# Patient Record
Sex: FEMALE | URBAN - METROPOLITAN AREA
[De-identification: names, ages, dates, MRNs, and addresses within clinical notes are randomized per-mention and may not be internally consistent; named-entity substitution may affect disease eponyms.]

---

## 2020-01-01 ENCOUNTER — INPATIENT (INPATIENT)
Facility: HOSPITAL | Age: 75
LOS: 7 days | Discharge: HOME HEALTH | DRG: 177 | End: 2020-05-03
Attending: FAMILY MEDICINE | Admitting: FAMILY MEDICINE
Payer: MEDICARE

## 2020-01-01 ENCOUNTER — INPATIENT (INPATIENT)
Facility: HOSPITAL | Age: 75
LOS: 0 days | DRG: 283 | End: 2020-05-13
Attending: INTERNAL MEDICINE | Admitting: INTERNAL MEDICINE
Payer: MEDICARE

## 2020-01-01 VITALS
SYSTOLIC BLOOD PRESSURE: 80 MMHG | OXYGEN SATURATION: 100 % | TEMPERATURE: 97 F | RESPIRATION RATE: 20 BRPM | DIASTOLIC BLOOD PRESSURE: 55 MMHG | HEART RATE: 117 BPM

## 2020-01-01 VITALS — OXYGEN SATURATION: 83 % | WEIGHT: 160.06 LBS

## 2020-01-01 VITALS — HEART RATE: 80 BPM | OXYGEN SATURATION: 100 %

## 2020-01-01 VITALS — OXYGEN SATURATION: 90 %

## 2020-01-01 DIAGNOSIS — N17.0 ACUTE KIDNEY FAILURE WITH TUBULAR NECROSIS: ICD-10-CM

## 2020-01-01 DIAGNOSIS — I46.9 CARDIAC ARREST, CAUSE UNSPECIFIED: ICD-10-CM

## 2020-01-01 DIAGNOSIS — I49.01 VENTRICULAR FIBRILLATION: ICD-10-CM

## 2020-01-01 DIAGNOSIS — I21.09 ST ELEVATION (STEMI) MYOCARDIAL INFARCTION INVOLVING OTHER CORONARY ARTERY OF ANTERIOR WALL: ICD-10-CM

## 2020-01-01 DIAGNOSIS — J96.01 ACUTE RESPIRATORY FAILURE WITH HYPOXIA: ICD-10-CM

## 2020-01-01 DIAGNOSIS — G93.1 ANOXIC BRAIN DAMAGE, NOT ELSEWHERE CLASSIFIED: ICD-10-CM

## 2020-01-01 DIAGNOSIS — U07.1 COVID-19: ICD-10-CM

## 2020-01-01 DIAGNOSIS — R09.02 HYPOXEMIA: ICD-10-CM

## 2020-01-01 DIAGNOSIS — I21.3 ST ELEVATION (STEMI) MYOCARDIAL INFARCTION OF UNSPECIFIED SITE: ICD-10-CM

## 2020-01-01 DIAGNOSIS — Z66 DO NOT RESUSCITATE: ICD-10-CM

## 2020-01-01 DIAGNOSIS — J12.89 OTHER VIRAL PNEUMONIA: ICD-10-CM

## 2020-01-01 DIAGNOSIS — I46.2 CARDIAC ARREST DUE TO UNDERLYING CARDIAC CONDITION: ICD-10-CM

## 2020-01-01 LAB
A1C WITH ESTIMATED AVERAGE GLUCOSE RESULT: 5.9 % — HIGH (ref 4–5.6)
ALBUMIN SERPL ELPH-MCNC: 1.7 G/DL — LOW (ref 3.3–5)
ALBUMIN SERPL ELPH-MCNC: 2.4 G/DL — LOW (ref 3.3–5)
ALBUMIN SERPL ELPH-MCNC: 2.6 G/DL — LOW (ref 3.3–5)
ALP SERPL-CCNC: 104 U/L — SIGNIFICANT CHANGE UP (ref 40–120)
ALP SERPL-CCNC: 84 U/L — SIGNIFICANT CHANGE UP (ref 40–120)
ALP SERPL-CCNC: 85 U/L — SIGNIFICANT CHANGE UP (ref 40–120)
ALT FLD-CCNC: 102 U/L — HIGH (ref 12–78)
ALT FLD-CCNC: 57 U/L — SIGNIFICANT CHANGE UP (ref 12–78)
ALT FLD-CCNC: 58 U/L — SIGNIFICANT CHANGE UP (ref 12–78)
ANION GAP SERPL CALC-SCNC: 10 MMOL/L — SIGNIFICANT CHANGE UP (ref 5–17)
ANION GAP SERPL CALC-SCNC: 10 MMOL/L — SIGNIFICANT CHANGE UP (ref 5–17)
ANION GAP SERPL CALC-SCNC: 22 MMOL/L — HIGH (ref 5–17)
ANION GAP SERPL CALC-SCNC: 5 MMOL/L — SIGNIFICANT CHANGE UP (ref 5–17)
ANION GAP SERPL CALC-SCNC: 6 MMOL/L — SIGNIFICANT CHANGE UP (ref 5–17)
ANION GAP SERPL CALC-SCNC: 7 MMOL/L — SIGNIFICANT CHANGE UP (ref 5–17)
ANION GAP SERPL CALC-SCNC: 7 MMOL/L — SIGNIFICANT CHANGE UP (ref 5–17)
ANION GAP SERPL CALC-SCNC: 8 MMOL/L — SIGNIFICANT CHANGE UP (ref 5–17)
ANION GAP SERPL CALC-SCNC: 9 MMOL/L — SIGNIFICANT CHANGE UP (ref 5–17)
APPEARANCE UR: CLEAR — SIGNIFICANT CHANGE UP
APTT BLD: 31.5 SEC — SIGNIFICANT CHANGE UP (ref 27.5–36.3)
APTT BLD: 53.1 SEC — HIGH (ref 27.5–36.3)
AST SERPL-CCNC: 130 U/L — HIGH (ref 15–37)
AST SERPL-CCNC: 63 U/L — HIGH (ref 15–37)
AST SERPL-CCNC: 73 U/L — HIGH (ref 15–37)
BASOPHILS # BLD AUTO: 0 K/UL — SIGNIFICANT CHANGE UP (ref 0–0.2)
BASOPHILS # BLD AUTO: 0.01 K/UL — SIGNIFICANT CHANGE UP (ref 0–0.2)
BASOPHILS # BLD AUTO: 0.04 K/UL — SIGNIFICANT CHANGE UP (ref 0–0.2)
BASOPHILS NFR BLD AUTO: 0 % — SIGNIFICANT CHANGE UP (ref 0–2)
BASOPHILS NFR BLD AUTO: 0.2 % — SIGNIFICANT CHANGE UP (ref 0–2)
BASOPHILS NFR BLD AUTO: 0.4 % — SIGNIFICANT CHANGE UP (ref 0–2)
BILIRUB SERPL-MCNC: 0.2 MG/DL — SIGNIFICANT CHANGE UP (ref 0.2–1.2)
BILIRUB SERPL-MCNC: 0.4 MG/DL — SIGNIFICANT CHANGE UP (ref 0.2–1.2)
BILIRUB SERPL-MCNC: 0.4 MG/DL — SIGNIFICANT CHANGE UP (ref 0.2–1.2)
BILIRUB UR-MCNC: NEGATIVE — SIGNIFICANT CHANGE UP
BUN SERPL-MCNC: 13 MG/DL — SIGNIFICANT CHANGE UP (ref 7–23)
BUN SERPL-MCNC: 17 MG/DL — SIGNIFICANT CHANGE UP (ref 7–23)
BUN SERPL-MCNC: 22 MG/DL — SIGNIFICANT CHANGE UP (ref 7–23)
BUN SERPL-MCNC: 23 MG/DL — SIGNIFICANT CHANGE UP (ref 7–23)
BUN SERPL-MCNC: 25 MG/DL — HIGH (ref 7–23)
BUN SERPL-MCNC: 26 MG/DL — HIGH (ref 7–23)
BUN SERPL-MCNC: 29 MG/DL — HIGH (ref 7–23)
CALCIUM SERPL-MCNC: 8.1 MG/DL — LOW (ref 8.5–10.1)
CALCIUM SERPL-MCNC: 8.4 MG/DL — LOW (ref 8.5–10.1)
CALCIUM SERPL-MCNC: 8.4 MG/DL — LOW (ref 8.5–10.1)
CALCIUM SERPL-MCNC: 8.6 MG/DL — SIGNIFICANT CHANGE UP (ref 8.5–10.1)
CALCIUM SERPL-MCNC: 8.8 MG/DL — SIGNIFICANT CHANGE UP (ref 8.5–10.1)
CALCIUM SERPL-MCNC: 8.9 MG/DL — SIGNIFICANT CHANGE UP (ref 8.5–10.1)
CALCIUM SERPL-MCNC: 9.1 MG/DL — SIGNIFICANT CHANGE UP (ref 8.5–10.1)
CHLORIDE SERPL-SCNC: 100 MMOL/L — SIGNIFICANT CHANGE UP (ref 96–108)
CHLORIDE SERPL-SCNC: 103 MMOL/L — SIGNIFICANT CHANGE UP (ref 96–108)
CHLORIDE SERPL-SCNC: 103 MMOL/L — SIGNIFICANT CHANGE UP (ref 96–108)
CHLORIDE SERPL-SCNC: 105 MMOL/L — SIGNIFICANT CHANGE UP (ref 96–108)
CHLORIDE SERPL-SCNC: 106 MMOL/L — SIGNIFICANT CHANGE UP (ref 96–108)
CHLORIDE SERPL-SCNC: 108 MMOL/L — SIGNIFICANT CHANGE UP (ref 96–108)
CHLORIDE SERPL-SCNC: 98 MMOL/L — SIGNIFICANT CHANGE UP (ref 96–108)
CHLORIDE SERPL-SCNC: 98 MMOL/L — SIGNIFICANT CHANGE UP (ref 96–108)
CHLORIDE SERPL-SCNC: 99 MMOL/L — SIGNIFICANT CHANGE UP (ref 96–108)
CK SERPL-CCNC: 77 U/L — SIGNIFICANT CHANGE UP (ref 26–192)
CO2 SERPL-SCNC: 15 MMOL/L — LOW (ref 22–31)
CO2 SERPL-SCNC: 23 MMOL/L — SIGNIFICANT CHANGE UP (ref 22–31)
CO2 SERPL-SCNC: 25 MMOL/L — SIGNIFICANT CHANGE UP (ref 22–31)
CO2 SERPL-SCNC: 25 MMOL/L — SIGNIFICANT CHANGE UP (ref 22–31)
CO2 SERPL-SCNC: 26 MMOL/L — SIGNIFICANT CHANGE UP (ref 22–31)
CO2 SERPL-SCNC: 30 MMOL/L — SIGNIFICANT CHANGE UP (ref 22–31)
CO2 SERPL-SCNC: 30 MMOL/L — SIGNIFICANT CHANGE UP (ref 22–31)
CO2 SERPL-SCNC: 31 MMOL/L — SIGNIFICANT CHANGE UP (ref 22–31)
CO2 SERPL-SCNC: 33 MMOL/L — HIGH (ref 22–31)
COLOR SPEC: YELLOW — SIGNIFICANT CHANGE UP
CREAT SERPL-MCNC: 0.54 MG/DL — SIGNIFICANT CHANGE UP (ref 0.5–1.3)
CREAT SERPL-MCNC: 0.55 MG/DL — SIGNIFICANT CHANGE UP (ref 0.5–1.3)
CREAT SERPL-MCNC: 0.55 MG/DL — SIGNIFICANT CHANGE UP (ref 0.5–1.3)
CREAT SERPL-MCNC: 0.6 MG/DL — SIGNIFICANT CHANGE UP (ref 0.5–1.3)
CREAT SERPL-MCNC: 0.66 MG/DL — SIGNIFICANT CHANGE UP (ref 0.5–1.3)
CREAT SERPL-MCNC: 0.7 MG/DL — SIGNIFICANT CHANGE UP (ref 0.5–1.3)
CREAT SERPL-MCNC: 0.78 MG/DL — SIGNIFICANT CHANGE UP (ref 0.5–1.3)
CREAT SERPL-MCNC: 0.78 MG/DL — SIGNIFICANT CHANGE UP (ref 0.5–1.3)
CREAT SERPL-MCNC: 1.08 MG/DL — SIGNIFICANT CHANGE UP (ref 0.5–1.3)
CRP SERPL-MCNC: 0.39 MG/DL — SIGNIFICANT CHANGE UP (ref 0–0.4)
CRP SERPL-MCNC: 0.62 MG/DL — HIGH (ref 0–0.4)
CRP SERPL-MCNC: 1.84 MG/DL — HIGH (ref 0–0.4)
CRP SERPL-MCNC: 7.37 MG/DL — HIGH (ref 0–0.4)
CULTURE RESULTS: SIGNIFICANT CHANGE UP
D DIMER BLD IA.RAPID-MCNC: 385 NG/ML DDU — HIGH
D DIMER BLD IA.RAPID-MCNC: 512 NG/ML DDU — HIGH
D DIMER BLD IA.RAPID-MCNC: 600 NG/ML DDU — HIGH
D DIMER BLD IA.RAPID-MCNC: HIGH NG/ML DDU
DIFF PNL FLD: NEGATIVE — SIGNIFICANT CHANGE UP
EOSINOPHIL # BLD AUTO: 0 K/UL — SIGNIFICANT CHANGE UP (ref 0–0.5)
EOSINOPHIL # BLD AUTO: 0.09 K/UL — SIGNIFICANT CHANGE UP (ref 0–0.5)
EOSINOPHIL # BLD AUTO: 0.13 K/UL — SIGNIFICANT CHANGE UP (ref 0–0.5)
EOSINOPHIL NFR BLD AUTO: 0 % — SIGNIFICANT CHANGE UP (ref 0–6)
EOSINOPHIL NFR BLD AUTO: 1 % — SIGNIFICANT CHANGE UP (ref 0–6)
EOSINOPHIL NFR BLD AUTO: 1.2 % — SIGNIFICANT CHANGE UP (ref 0–6)
ESTIMATED AVERAGE GLUCOSE: 123 MG/DL — HIGH (ref 68–114)
FERRITIN SERPL-MCNC: 1086 NG/ML — HIGH (ref 15–150)
FERRITIN SERPL-MCNC: 1247 NG/ML — HIGH (ref 15–150)
FERRITIN SERPL-MCNC: 2525 NG/ML — HIGH (ref 15–150)
FIBRINOGEN PPP-MCNC: 546 MG/DL — HIGH (ref 320–520)
GLUCOSE SERPL-MCNC: 100 MG/DL — HIGH (ref 70–99)
GLUCOSE SERPL-MCNC: 105 MG/DL — HIGH (ref 70–99)
GLUCOSE SERPL-MCNC: 111 MG/DL — HIGH (ref 70–99)
GLUCOSE SERPL-MCNC: 122 MG/DL — HIGH (ref 70–99)
GLUCOSE SERPL-MCNC: 130 MG/DL — HIGH (ref 70–99)
GLUCOSE SERPL-MCNC: 144 MG/DL — HIGH (ref 70–99)
GLUCOSE SERPL-MCNC: 147 MG/DL — HIGH (ref 70–99)
GLUCOSE SERPL-MCNC: 203 MG/DL — HIGH (ref 70–99)
GLUCOSE SERPL-MCNC: 441 MG/DL — HIGH (ref 70–99)
GLUCOSE UR QL: NEGATIVE MG/DL — SIGNIFICANT CHANGE UP
HCT VFR BLD CALC: 34.5 % — SIGNIFICANT CHANGE UP (ref 34.5–45)
HCT VFR BLD CALC: 39.5 % — SIGNIFICANT CHANGE UP (ref 34.5–45)
HCT VFR BLD CALC: 40 % — SIGNIFICANT CHANGE UP (ref 34.5–45)
HCT VFR BLD CALC: 41.1 % — SIGNIFICANT CHANGE UP (ref 34.5–45)
HCT VFR BLD CALC: 41.4 % — SIGNIFICANT CHANGE UP (ref 34.5–45)
HCT VFR BLD CALC: 41.5 % — SIGNIFICANT CHANGE UP (ref 34.5–45)
HCT VFR BLD CALC: 41.8 % — SIGNIFICANT CHANGE UP (ref 34.5–45)
HCV AB S/CO SERPL IA: 0.24 S/CO — SIGNIFICANT CHANGE UP (ref 0–0.99)
HCV AB SERPL-IMP: SIGNIFICANT CHANGE UP
HGB BLD-MCNC: 10.1 G/DL — LOW (ref 11.5–15.5)
HGB BLD-MCNC: 13.4 G/DL — SIGNIFICANT CHANGE UP (ref 11.5–15.5)
HGB BLD-MCNC: 13.5 G/DL — SIGNIFICANT CHANGE UP (ref 11.5–15.5)
HGB BLD-MCNC: 13.6 G/DL — SIGNIFICANT CHANGE UP (ref 11.5–15.5)
HGB BLD-MCNC: 13.8 G/DL — SIGNIFICANT CHANGE UP (ref 11.5–15.5)
HGB BLD-MCNC: 14.1 G/DL — SIGNIFICANT CHANGE UP (ref 11.5–15.5)
HGB BLD-MCNC: 14.2 G/DL — SIGNIFICANT CHANGE UP (ref 11.5–15.5)
IMM GRANULOCYTES NFR BLD AUTO: 1.6 % — HIGH (ref 0–1.5)
IMM GRANULOCYTES NFR BLD AUTO: 5.2 % — HIGH (ref 0–1.5)
INR BLD: 1.1 RATIO — SIGNIFICANT CHANGE UP (ref 0.88–1.16)
INR BLD: 1.11 RATIO — SIGNIFICANT CHANGE UP (ref 0.88–1.16)
KETONES UR-MCNC: NEGATIVE — SIGNIFICANT CHANGE UP
LACTATE SERPL-SCNC: 1.4 MMOL/L — SIGNIFICANT CHANGE UP (ref 0.7–2)
LACTATE SERPL-SCNC: 15.1 MMOL/L — CRITICAL HIGH (ref 0.7–2)
LDH SERPL L TO P-CCNC: 355 U/L — HIGH (ref 84–241)
LDH SERPL L TO P-CCNC: 383 U/L — HIGH (ref 84–241)
LEUKOCYTE ESTERASE UR-ACNC: ABNORMAL
LYMPHOCYTES # BLD AUTO: 0.48 K/UL — LOW (ref 1–3.3)
LYMPHOCYTES # BLD AUTO: 0.97 K/UL — LOW (ref 1–3.3)
LYMPHOCYTES # BLD AUTO: 10.7 % — LOW (ref 13–44)
LYMPHOCYTES # BLD AUTO: 3.71 K/UL — HIGH (ref 1–3.3)
LYMPHOCYTES # BLD AUTO: 42 % — SIGNIFICANT CHANGE UP (ref 13–44)
LYMPHOCYTES # BLD AUTO: 9.1 % — LOW (ref 13–44)
MCHC RBC-ENTMCNC: 28.5 PG — SIGNIFICANT CHANGE UP (ref 27–34)
MCHC RBC-ENTMCNC: 28.5 PG — SIGNIFICANT CHANGE UP (ref 27–34)
MCHC RBC-ENTMCNC: 28.6 PG — SIGNIFICANT CHANGE UP (ref 27–34)
MCHC RBC-ENTMCNC: 28.8 PG — SIGNIFICANT CHANGE UP (ref 27–34)
MCHC RBC-ENTMCNC: 28.9 PG — SIGNIFICANT CHANGE UP (ref 27–34)
MCHC RBC-ENTMCNC: 28.9 PG — SIGNIFICANT CHANGE UP (ref 27–34)
MCHC RBC-ENTMCNC: 29.2 PG — SIGNIFICANT CHANGE UP (ref 27–34)
MCHC RBC-ENTMCNC: 29.3 GM/DL — LOW (ref 32–36)
MCHC RBC-ENTMCNC: 33.1 GM/DL — SIGNIFICANT CHANGE UP (ref 32–36)
MCHC RBC-ENTMCNC: 33.3 GM/DL — SIGNIFICANT CHANGE UP (ref 32–36)
MCHC RBC-ENTMCNC: 33.7 GM/DL — SIGNIFICANT CHANGE UP (ref 32–36)
MCHC RBC-ENTMCNC: 33.8 GM/DL — SIGNIFICANT CHANGE UP (ref 32–36)
MCHC RBC-ENTMCNC: 33.9 GM/DL — SIGNIFICANT CHANGE UP (ref 32–36)
MCHC RBC-ENTMCNC: 34.2 GM/DL — SIGNIFICANT CHANGE UP (ref 32–36)
MCV RBC AUTO: 84.7 FL — SIGNIFICANT CHANGE UP (ref 80–100)
MCV RBC AUTO: 85.2 FL — SIGNIFICANT CHANGE UP (ref 80–100)
MCV RBC AUTO: 85.3 FL — SIGNIFICANT CHANGE UP (ref 80–100)
MCV RBC AUTO: 85.4 FL — SIGNIFICANT CHANGE UP (ref 80–100)
MCV RBC AUTO: 85.7 FL — SIGNIFICANT CHANGE UP (ref 80–100)
MCV RBC AUTO: 86 FL — SIGNIFICANT CHANGE UP (ref 80–100)
MCV RBC AUTO: 98.3 FL — SIGNIFICANT CHANGE UP (ref 80–100)
MONOCYTES # BLD AUTO: 0.33 K/UL — SIGNIFICANT CHANGE UP (ref 0–0.9)
MONOCYTES # BLD AUTO: 0.53 K/UL — SIGNIFICANT CHANGE UP (ref 0–0.9)
MONOCYTES # BLD AUTO: 0.72 K/UL — SIGNIFICANT CHANGE UP (ref 0–0.9)
MONOCYTES NFR BLD AUTO: 6 % — SIGNIFICANT CHANGE UP (ref 2–14)
MONOCYTES NFR BLD AUTO: 6.8 % — SIGNIFICANT CHANGE UP (ref 2–14)
MONOCYTES NFR BLD AUTO: 7.4 % — SIGNIFICANT CHANGE UP (ref 2–14)
NEUTROPHILS # BLD AUTO: 3.59 K/UL — SIGNIFICANT CHANGE UP (ref 1.8–7.4)
NEUTROPHILS # BLD AUTO: 4.15 K/UL — SIGNIFICANT CHANGE UP (ref 1.8–7.4)
NEUTROPHILS # BLD AUTO: 8.23 K/UL — HIGH (ref 1.8–7.4)
NEUTROPHILS NFR BLD AUTO: 43 % — SIGNIFICANT CHANGE UP (ref 43–77)
NEUTROPHILS NFR BLD AUTO: 77.3 % — HIGH (ref 43–77)
NEUTROPHILS NFR BLD AUTO: 80.1 % — HIGH (ref 43–77)
NITRITE UR-MCNC: NEGATIVE — SIGNIFICANT CHANGE UP
NRBC # BLD: SIGNIFICANT CHANGE UP /100 WBCS (ref 0–0)
PH UR: 6 — SIGNIFICANT CHANGE UP (ref 5–8)
PLATELET # BLD AUTO: 179 K/UL — SIGNIFICANT CHANGE UP (ref 150–400)
PLATELET # BLD AUTO: 210 K/UL — SIGNIFICANT CHANGE UP (ref 150–400)
PLATELET # BLD AUTO: 217 K/UL — SIGNIFICANT CHANGE UP (ref 150–400)
PLATELET # BLD AUTO: 228 K/UL — SIGNIFICANT CHANGE UP (ref 150–400)
PLATELET # BLD AUTO: 303 K/UL — SIGNIFICANT CHANGE UP (ref 150–400)
PLATELET # BLD AUTO: 369 K/UL — SIGNIFICANT CHANGE UP (ref 150–400)
PLATELET # BLD AUTO: 392 K/UL — SIGNIFICANT CHANGE UP (ref 150–400)
POTASSIUM SERPL-MCNC: 3.7 MMOL/L — SIGNIFICANT CHANGE UP (ref 3.5–5.3)
POTASSIUM SERPL-MCNC: 3.9 MMOL/L — SIGNIFICANT CHANGE UP (ref 3.5–5.3)
POTASSIUM SERPL-MCNC: 4 MMOL/L — SIGNIFICANT CHANGE UP (ref 3.5–5.3)
POTASSIUM SERPL-MCNC: 4 MMOL/L — SIGNIFICANT CHANGE UP (ref 3.5–5.3)
POTASSIUM SERPL-MCNC: 4.1 MMOL/L — SIGNIFICANT CHANGE UP (ref 3.5–5.3)
POTASSIUM SERPL-MCNC: 4.1 MMOL/L — SIGNIFICANT CHANGE UP (ref 3.5–5.3)
POTASSIUM SERPL-MCNC: 4.4 MMOL/L — SIGNIFICANT CHANGE UP (ref 3.5–5.3)
POTASSIUM SERPL-MCNC: 4.4 MMOL/L — SIGNIFICANT CHANGE UP (ref 3.5–5.3)
POTASSIUM SERPL-MCNC: 4.6 MMOL/L — SIGNIFICANT CHANGE UP (ref 3.5–5.3)
POTASSIUM SERPL-SCNC: 3.7 MMOL/L — SIGNIFICANT CHANGE UP (ref 3.5–5.3)
POTASSIUM SERPL-SCNC: 3.9 MMOL/L — SIGNIFICANT CHANGE UP (ref 3.5–5.3)
POTASSIUM SERPL-SCNC: 4 MMOL/L — SIGNIFICANT CHANGE UP (ref 3.5–5.3)
POTASSIUM SERPL-SCNC: 4 MMOL/L — SIGNIFICANT CHANGE UP (ref 3.5–5.3)
POTASSIUM SERPL-SCNC: 4.1 MMOL/L — SIGNIFICANT CHANGE UP (ref 3.5–5.3)
POTASSIUM SERPL-SCNC: 4.1 MMOL/L — SIGNIFICANT CHANGE UP (ref 3.5–5.3)
POTASSIUM SERPL-SCNC: 4.4 MMOL/L — SIGNIFICANT CHANGE UP (ref 3.5–5.3)
POTASSIUM SERPL-SCNC: 4.4 MMOL/L — SIGNIFICANT CHANGE UP (ref 3.5–5.3)
POTASSIUM SERPL-SCNC: 4.6 MMOL/L — SIGNIFICANT CHANGE UP (ref 3.5–5.3)
PROCALCITONIN SERPL-MCNC: 0.05 NG/ML — SIGNIFICANT CHANGE UP (ref 0.02–0.1)
PROCALCITONIN SERPL-MCNC: 0.12 NG/ML — HIGH (ref 0.02–0.1)
PROT SERPL-MCNC: 4.9 GM/DL — LOW (ref 6–8.3)
PROT SERPL-MCNC: 6.5 GM/DL — SIGNIFICANT CHANGE UP (ref 6–8.3)
PROT SERPL-MCNC: 7 GM/DL — SIGNIFICANT CHANGE UP (ref 6–8.3)
PROT UR-MCNC: NEGATIVE MG/DL — SIGNIFICANT CHANGE UP
PROTHROM AB SERPL-ACNC: 12.2 SEC — SIGNIFICANT CHANGE UP (ref 10–12.9)
PROTHROM AB SERPL-ACNC: 12.4 SEC — SIGNIFICANT CHANGE UP (ref 10–12.9)
RBC # BLD: 3.51 M/UL — LOW (ref 3.8–5.2)
RBC # BLD: 4.63 M/UL — SIGNIFICANT CHANGE UP (ref 3.8–5.2)
RBC # BLD: 4.72 M/UL — SIGNIFICANT CHANGE UP (ref 3.8–5.2)
RBC # BLD: 4.78 M/UL — SIGNIFICANT CHANGE UP (ref 3.8–5.2)
RBC # BLD: 4.85 M/UL — SIGNIFICANT CHANGE UP (ref 3.8–5.2)
RBC # BLD: 4.87 M/UL — SIGNIFICANT CHANGE UP (ref 3.8–5.2)
RBC # BLD: 4.88 M/UL — SIGNIFICANT CHANGE UP (ref 3.8–5.2)
RBC # FLD: 12.3 % — SIGNIFICANT CHANGE UP (ref 10.3–14.5)
RBC # FLD: 12.4 % — SIGNIFICANT CHANGE UP (ref 10.3–14.5)
RBC # FLD: 12.8 % — SIGNIFICANT CHANGE UP (ref 10.3–14.5)
RBC # FLD: 12.9 % — SIGNIFICANT CHANGE UP (ref 10.3–14.5)
RBC # FLD: 12.9 % — SIGNIFICANT CHANGE UP (ref 10.3–14.5)
RBC # FLD: 13 % — SIGNIFICANT CHANGE UP (ref 10.3–14.5)
RBC # FLD: 13.1 % — SIGNIFICANT CHANGE UP (ref 10.3–14.5)
SARS-COV-2 RNA SPEC QL NAA+PROBE: DETECTED
SARS-COV-2 RNA SPEC QL NAA+PROBE: SIGNIFICANT CHANGE UP
SODIUM SERPL-SCNC: 135 MMOL/L — SIGNIFICANT CHANGE UP (ref 135–145)
SODIUM SERPL-SCNC: 136 MMOL/L — SIGNIFICANT CHANGE UP (ref 135–145)
SODIUM SERPL-SCNC: 136 MMOL/L — SIGNIFICANT CHANGE UP (ref 135–145)
SODIUM SERPL-SCNC: 138 MMOL/L — SIGNIFICANT CHANGE UP (ref 135–145)
SODIUM SERPL-SCNC: 138 MMOL/L — SIGNIFICANT CHANGE UP (ref 135–145)
SODIUM SERPL-SCNC: 139 MMOL/L — SIGNIFICANT CHANGE UP (ref 135–145)
SODIUM SERPL-SCNC: 140 MMOL/L — SIGNIFICANT CHANGE UP (ref 135–145)
SP GR SPEC: 1.02 — SIGNIFICANT CHANGE UP (ref 1.01–1.02)
SPECIMEN SOURCE: SIGNIFICANT CHANGE UP
UROBILINOGEN FLD QL: NEGATIVE MG/DL — SIGNIFICANT CHANGE UP
WBC # BLD: 10.44 K/UL — SIGNIFICANT CHANGE UP (ref 3.8–10.5)
WBC # BLD: 10.64 K/UL — HIGH (ref 3.8–10.5)
WBC # BLD: 2.98 K/UL — LOW (ref 3.8–10.5)
WBC # BLD: 4.48 K/UL — SIGNIFICANT CHANGE UP (ref 3.8–10.5)
WBC # BLD: 7.77 K/UL — SIGNIFICANT CHANGE UP (ref 3.8–10.5)
WBC # BLD: 8.23 K/UL — SIGNIFICANT CHANGE UP (ref 3.8–10.5)
WBC # BLD: 8.83 K/UL — SIGNIFICANT CHANGE UP (ref 3.8–10.5)
WBC # FLD AUTO: 10.44 K/UL — SIGNIFICANT CHANGE UP (ref 3.8–10.5)
WBC # FLD AUTO: 10.64 K/UL — HIGH (ref 3.8–10.5)
WBC # FLD AUTO: 2.98 K/UL — LOW (ref 3.8–10.5)
WBC # FLD AUTO: 4.48 K/UL — SIGNIFICANT CHANGE UP (ref 3.8–10.5)
WBC # FLD AUTO: 7.77 K/UL — SIGNIFICANT CHANGE UP (ref 3.8–10.5)
WBC # FLD AUTO: 8.23 K/UL — SIGNIFICANT CHANGE UP (ref 3.8–10.5)
WBC # FLD AUTO: 8.83 K/UL — SIGNIFICANT CHANGE UP (ref 3.8–10.5)

## 2020-01-01 PROCEDURE — 36415 COLL VENOUS BLD VENIPUNCTURE: CPT

## 2020-01-01 PROCEDURE — 70450 CT HEAD/BRAIN W/O DYE: CPT | Mod: 26

## 2020-01-01 PROCEDURE — 85027 COMPLETE CBC AUTOMATED: CPT

## 2020-01-01 PROCEDURE — 80053 COMPREHEN METABOLIC PANEL: CPT

## 2020-01-01 PROCEDURE — 71275 CT ANGIOGRAPHY CHEST: CPT

## 2020-01-01 PROCEDURE — 86140 C-REACTIVE PROTEIN: CPT

## 2020-01-01 PROCEDURE — 99233 SBSQ HOSP IP/OBS HIGH 50: CPT | Mod: CS

## 2020-01-01 PROCEDURE — 85025 COMPLETE CBC W/AUTO DIFF WBC: CPT

## 2020-01-01 PROCEDURE — 99223 1ST HOSP IP/OBS HIGH 75: CPT

## 2020-01-01 PROCEDURE — 85379 FIBRIN DEGRADATION QUANT: CPT

## 2020-01-01 PROCEDURE — 83615 LACTATE (LD) (LDH) ENZYME: CPT

## 2020-01-01 PROCEDURE — 83605 ASSAY OF LACTIC ACID: CPT

## 2020-01-01 PROCEDURE — 99232 SBSQ HOSP IP/OBS MODERATE 35: CPT | Mod: CS

## 2020-01-01 PROCEDURE — 99223 1ST HOSP IP/OBS HIGH 75: CPT | Mod: CS

## 2020-01-01 PROCEDURE — 93010 ELECTROCARDIOGRAM REPORT: CPT

## 2020-01-01 PROCEDURE — 86803 HEPATITIS C AB TEST: CPT

## 2020-01-01 PROCEDURE — 82962 GLUCOSE BLOOD TEST: CPT

## 2020-01-01 PROCEDURE — 71275 CT ANGIOGRAPHY CHEST: CPT | Mod: 26

## 2020-01-01 PROCEDURE — 82728 ASSAY OF FERRITIN: CPT

## 2020-01-01 PROCEDURE — 71045 X-RAY EXAM CHEST 1 VIEW: CPT | Mod: 26

## 2020-01-01 PROCEDURE — 99239 HOSP IP/OBS DSCHRG MGMT >30: CPT | Mod: CS

## 2020-01-01 PROCEDURE — 80048 BASIC METABOLIC PNL TOTAL CA: CPT

## 2020-01-01 PROCEDURE — 70450 CT HEAD/BRAIN W/O DYE: CPT

## 2020-01-01 PROCEDURE — 83036 HEMOGLOBIN GLYCOSYLATED A1C: CPT

## 2020-01-01 PROCEDURE — 71045 X-RAY EXAM CHEST 1 VIEW: CPT | Mod: 26,CS

## 2020-01-01 PROCEDURE — 94003 VENT MGMT INPAT SUBQ DAY: CPT

## 2020-01-01 RX ORDER — SODIUM CHLORIDE 9 MG/ML
1000 INJECTION INTRAMUSCULAR; INTRAVENOUS; SUBCUTANEOUS ONCE
Refills: 0 | Status: COMPLETED | OUTPATIENT
Start: 2020-01-01 | End: 2020-01-01

## 2020-01-01 RX ORDER — HYDROXYCHLOROQUINE SULFATE 200 MG
TABLET ORAL
Refills: 0 | Status: DISCONTINUED | OUTPATIENT
Start: 2020-01-01 | End: 2020-01-01

## 2020-01-01 RX ORDER — ASPIRIN/CALCIUM CARB/MAGNESIUM 324 MG
81 TABLET ORAL DAILY
Refills: 0 | Status: DISCONTINUED | OUTPATIENT
Start: 2020-01-01 | End: 2020-01-01

## 2020-01-01 RX ORDER — DEXTROSE 50 % IN WATER 50 %
12.5 SYRINGE (ML) INTRAVENOUS ONCE
Refills: 0 | Status: DISCONTINUED | OUTPATIENT
Start: 2020-01-01 | End: 2020-01-01

## 2020-01-01 RX ORDER — ACETAMINOPHEN 500 MG
650 TABLET ORAL EVERY 4 HOURS
Refills: 0 | Status: DISCONTINUED | OUTPATIENT
Start: 2020-01-01 | End: 2020-01-01

## 2020-01-01 RX ORDER — SODIUM CHLORIDE 9 MG/ML
1000 INJECTION, SOLUTION INTRAVENOUS
Refills: 0 | Status: DISCONTINUED | OUTPATIENT
Start: 2020-01-01 | End: 2020-01-01

## 2020-01-01 RX ORDER — HYDROXYCHLOROQUINE SULFATE 200 MG
400 TABLET ORAL EVERY 24 HOURS
Refills: 0 | Status: DISCONTINUED | OUTPATIENT
Start: 2020-01-01 | End: 2020-01-01

## 2020-01-01 RX ORDER — FUROSEMIDE 40 MG
40 TABLET ORAL ONCE
Refills: 0 | Status: COMPLETED | OUTPATIENT
Start: 2020-01-01 | End: 2020-01-01

## 2020-01-01 RX ORDER — HYDROXYCHLOROQUINE SULFATE 200 MG
800 TABLET ORAL EVERY 24 HOURS
Refills: 0 | Status: COMPLETED | OUTPATIENT
Start: 2020-01-01 | End: 2020-01-01

## 2020-01-01 RX ORDER — CLOPIDOGREL BISULFATE 75 MG/1
300 TABLET, FILM COATED ORAL ONCE
Refills: 0 | Status: DISCONTINUED | OUTPATIENT
Start: 2020-01-01 | End: 2020-01-01

## 2020-01-01 RX ORDER — CHLORHEXIDINE GLUCONATE 213 G/1000ML
15 SOLUTION TOPICAL EVERY 12 HOURS
Refills: 0 | Status: DISCONTINUED | OUTPATIENT
Start: 2020-01-01 | End: 2020-01-01

## 2020-01-01 RX ORDER — FUROSEMIDE 40 MG
20 TABLET ORAL ONCE
Refills: 0 | Status: COMPLETED | OUTPATIENT
Start: 2020-01-01 | End: 2020-01-01

## 2020-01-01 RX ORDER — DEXTROSE 50 % IN WATER 50 %
25 SYRINGE (ML) INTRAVENOUS ONCE
Refills: 0 | Status: DISCONTINUED | OUTPATIENT
Start: 2020-01-01 | End: 2020-01-01

## 2020-01-01 RX ORDER — PROPOFOL 10 MG/ML
20 INJECTION, EMULSION INTRAVENOUS
Qty: 1000 | Refills: 0 | Status: DISCONTINUED | OUTPATIENT
Start: 2020-01-01 | End: 2020-01-01

## 2020-01-01 RX ORDER — NOREPINEPHRINE BITARTRATE/D5W 8 MG/250ML
0.05 PLASTIC BAG, INJECTION (ML) INTRAVENOUS
Qty: 8 | Refills: 0 | Status: DISCONTINUED | OUTPATIENT
Start: 2020-01-01 | End: 2020-01-01

## 2020-01-01 RX ORDER — ENOXAPARIN SODIUM 100 MG/ML
40 INJECTION SUBCUTANEOUS DAILY
Refills: 0 | Status: DISCONTINUED | OUTPATIENT
Start: 2020-01-01 | End: 2020-01-01

## 2020-01-01 RX ORDER — RIVAROXABAN 15 MG-20MG
1 KIT ORAL
Qty: 39 | Refills: 0
Start: 2020-01-01 | End: 2020-06-09

## 2020-01-01 RX ORDER — ALBUTEROL 90 UG/1
2 AEROSOL, METERED ORAL
Qty: 8.4 | Refills: 0
Start: 2020-01-01 | End: 2020-01-01

## 2020-01-01 RX ORDER — PANTOPRAZOLE SODIUM 20 MG/1
40 TABLET, DELAYED RELEASE ORAL DAILY
Refills: 0 | Status: DISCONTINUED | OUTPATIENT
Start: 2020-01-01 | End: 2020-01-01

## 2020-01-01 RX ORDER — ASPIRIN/CALCIUM CARB/MAGNESIUM 324 MG
600 TABLET ORAL ONCE
Refills: 0 | Status: COMPLETED | OUTPATIENT
Start: 2020-01-01 | End: 2020-01-01

## 2020-01-01 RX ORDER — ALBUTEROL 90 UG/1
2 AEROSOL, METERED ORAL EVERY 4 HOURS
Refills: 0 | Status: DISCONTINUED | OUTPATIENT
Start: 2020-01-01 | End: 2020-01-01

## 2020-01-01 RX ORDER — ONDANSETRON 8 MG/1
1 TABLET, FILM COATED ORAL
Qty: 0 | Refills: 0 | DISCHARGE
Start: 2020-01-01 | End: 2020-01-01

## 2020-01-01 RX ORDER — GLUCAGON INJECTION, SOLUTION 0.5 MG/.1ML
1 INJECTION, SOLUTION SUBCUTANEOUS ONCE
Refills: 0 | Status: DISCONTINUED | OUTPATIENT
Start: 2020-01-01 | End: 2020-01-01

## 2020-01-01 RX ORDER — DEXTROSE 50 % IN WATER 50 %
15 SYRINGE (ML) INTRAVENOUS ONCE
Refills: 0 | Status: DISCONTINUED | OUTPATIENT
Start: 2020-01-01 | End: 2020-01-01

## 2020-01-01 RX ADMIN — Medication 400 MILLIGRAM(S): at 17:28

## 2020-01-01 RX ADMIN — Medication 600 MILLIGRAM(S): at 17:11

## 2020-01-01 RX ADMIN — ENOXAPARIN SODIUM 40 MILLIGRAM(S): 100 INJECTION SUBCUTANEOUS at 12:49

## 2020-01-01 RX ADMIN — Medication 600 MILLIGRAM(S): at 05:14

## 2020-01-01 RX ADMIN — ENOXAPARIN SODIUM 40 MILLIGRAM(S): 100 INJECTION SUBCUTANEOUS at 12:29

## 2020-01-01 RX ADMIN — ENOXAPARIN SODIUM 40 MILLIGRAM(S): 100 INJECTION SUBCUTANEOUS at 13:17

## 2020-01-01 RX ADMIN — Medication 70 MILLIGRAM(S): at 17:49

## 2020-01-01 RX ADMIN — Medication 600 MILLIGRAM(S): at 05:07

## 2020-01-01 RX ADMIN — Medication 650 MILLIGRAM(S): at 05:30

## 2020-01-01 RX ADMIN — Medication 70 MILLIGRAM(S): at 05:14

## 2020-01-01 RX ADMIN — Medication 600 MILLIGRAM(S): at 17:51

## 2020-01-01 RX ADMIN — Medication 70 MILLIGRAM(S): at 04:53

## 2020-01-01 RX ADMIN — SODIUM CHLORIDE 1000 MILLILITER(S): 9 INJECTION INTRAMUSCULAR; INTRAVENOUS; SUBCUTANEOUS at 21:15

## 2020-01-01 RX ADMIN — Medication 400 MILLIGRAM(S): at 18:06

## 2020-01-01 RX ADMIN — SODIUM CHLORIDE 1000 MILLILITER(S): 9 INJECTION INTRAMUSCULAR; INTRAVENOUS; SUBCUTANEOUS at 14:33

## 2020-01-01 RX ADMIN — Medication 600 MILLIGRAM(S): at 18:41

## 2020-01-01 RX ADMIN — Medication 650 MILLIGRAM(S): at 00:41

## 2020-01-01 RX ADMIN — Medication 100 MILLIGRAM(S): at 12:22

## 2020-01-01 RX ADMIN — Medication 40 MILLIGRAM(S): at 17:01

## 2020-01-01 RX ADMIN — Medication 600 MILLIGRAM(S): at 06:30

## 2020-01-01 RX ADMIN — Medication 40 MILLIGRAM(S): at 14:32

## 2020-01-01 RX ADMIN — Medication 70 MILLIGRAM(S): at 05:12

## 2020-01-01 RX ADMIN — Medication 70 MILLIGRAM(S): at 05:04

## 2020-01-01 RX ADMIN — Medication 650 MILLIGRAM(S): at 20:04

## 2020-01-01 RX ADMIN — Medication 600 MILLIGRAM(S): at 04:53

## 2020-01-01 RX ADMIN — Medication 600 MILLIGRAM(S): at 17:01

## 2020-01-01 RX ADMIN — PROPOFOL 8.4 MICROGRAM(S)/KG/MIN: 10 INJECTION, EMULSION INTRAVENOUS at 00:45

## 2020-01-01 RX ADMIN — Medication 600 MILLIGRAM(S): at 18:06

## 2020-01-01 RX ADMIN — ENOXAPARIN SODIUM 40 MILLIGRAM(S): 100 INJECTION SUBCUTANEOUS at 12:21

## 2020-01-01 RX ADMIN — Medication 600 MILLIGRAM(S): at 16:23

## 2020-01-01 RX ADMIN — ENOXAPARIN SODIUM 40 MILLIGRAM(S): 100 INJECTION SUBCUTANEOUS at 11:04

## 2020-01-01 RX ADMIN — Medication 650 MILLIGRAM(S): at 21:11

## 2020-01-01 RX ADMIN — Medication 70 MILLIGRAM(S): at 05:49

## 2020-01-01 RX ADMIN — Medication 800 MILLIGRAM(S): at 17:49

## 2020-01-01 RX ADMIN — Medication 650 MILLIGRAM(S): at 06:23

## 2020-01-01 RX ADMIN — ENOXAPARIN SODIUM 40 MILLIGRAM(S): 100 INJECTION SUBCUTANEOUS at 13:18

## 2020-01-01 RX ADMIN — ENOXAPARIN SODIUM 40 MILLIGRAM(S): 100 INJECTION SUBCUTANEOUS at 08:19

## 2020-01-01 RX ADMIN — SODIUM CHLORIDE 1000 MILLILITER(S): 9 INJECTION INTRAMUSCULAR; INTRAVENOUS; SUBCUTANEOUS at 15:33

## 2020-01-01 RX ADMIN — ENOXAPARIN SODIUM 40 MILLIGRAM(S): 100 INJECTION SUBCUTANEOUS at 10:08

## 2020-01-01 RX ADMIN — Medication 600 MILLIGRAM(S): at 21:32

## 2020-01-01 RX ADMIN — Medication 40 MILLIGRAM(S): at 12:21

## 2020-01-01 RX ADMIN — CHLORHEXIDINE GLUCONATE 15 MILLILITER(S): 213 SOLUTION TOPICAL at 06:43

## 2020-01-01 RX ADMIN — Medication 20 MILLIGRAM(S): at 17:51

## 2020-01-01 RX ADMIN — Medication 6.56 MICROGRAM(S)/KG/MIN: at 21:15

## 2020-01-01 RX ADMIN — Medication 600 MILLIGRAM(S): at 05:57

## 2020-04-25 NOTE — H&P ADULT - ASSESSMENT
patient on NRB, satting %    acute hypoxic respiratory failure due to covid19  -admit to med-surg   -start solumedrol, check BGM am, qhs for now if elevated would recommend adding insulin sliding scale and QID BGM check  -trend inflammatory markers, recheck in 3 days, worsen if clinically indicated   -o2 support, titrate to keep sp02>94%  -albuterol as needed  -strict i/o     dvt prophylaxis  -lovenox sc    advance directives  -full code patient on NRB, satting %    acute hypoxic respiratory failure due to covid19  -admit to med-surg   -start plaquenil, qt interval <500  -start solumedrol, check BGM am, qhs for now if elevated would recommend adding insulin sliding scale and QID BGM check  -trend inflammatory markers, recheck in 3 days, worsen if clinically indicated   -o2 support, titrate to keep sp02>94%  -albuterol as needed  -strict i/o     dvt prophylaxis  -lovenox sc    advance directives  -full code

## 2020-04-25 NOTE — ED PROVIDER NOTE - PROGRESS NOTE DETAILS
Lucila DINERO for ED attending, Dr. Kee: Suzi Wagoner [phone]. results noted.   will admit for hypoxia, sob positive COVID.   case d/w Czira and will admit with bridge and SEAN

## 2020-04-25 NOTE — ED PROVIDER NOTE - CONSTITUTIONAL, MLM
normal... Awake, alert, oriented to person, place, time/situation and in no apparent distress. +ill-appearing

## 2020-04-25 NOTE — H&P ADULT - NSHPPHYSICALEXAM_GEN_ALL_CORE
PHYSICAL EXAM:    GENERAL: NAD, Alert & Oriented X3,   HEAD:  Atraumatic, Normocephalic  EYES: EOMI, PERRLA, Conjunctiva and sclera clear  NECK: Supple, No JVD, No lymphadenopathy  CHEST/LUNG: coarse rales bilaterally   HEART: Regular rate and rhythm; No murmurs, rubs, or gallops  ABDOMEN: Soft, Non-tender, Non-distended; Bowel sounds present  GENITOURINARY- Voiding, No palpable bladder  EXTREMITIES:  2+ Peripheral Pulses, No clubbing, cyanosis, or edema  MUSCULOSKELTAL- No muscle tenderness, Muscle tone normal, No joint tenderness, no Joint swelling, FROM  SKIN: No rash, No lesion  NEURO: Motor Strength- 5/5 B/L upper and lower extremities; DTRs 2+ intact and symmetric

## 2020-04-25 NOTE — ED PROVIDER NOTE - OBJECTIVE STATEMENT
74 YOF w/ no PMHx presents to the ED c/o worsening SOB, cough, fever, myalgia x2 weeks. Tested COVID+ last week at [facility].  Was found at home to be hypoxic down to 80%. Pt O2 sat is 83% on room air, 100% on non-rebreather. No other complaints at this time.

## 2020-04-26 NOTE — PROGRESS NOTE ADULT - ASSESSMENT
Imp:   Acute hypoxic respiratory failure due to covid19    #Covid  - Cont plaquenil, qt interval <500  - Cont solumedrol, follow BGMS  - trend inflammatory markers, recheck in 3 days, worsen if clinically indicated   -o2 support, titrate as needed  -albuterol as needed    dvt prophylaxis  -lovenox sc    advance directives  -full code

## 2020-04-26 NOTE — PROGRESS NOTE ADULT - SUBJECTIVE AND OBJECTIVE BOX
Patient is a 74y old  Female who presents with a chief complaint of sob (25 Apr 2020 16:51)      Subective:  75 y/o female presents to the ED c/o worsening SOB, cough, fever, myalgia x2 weeks.  states was tested COVID+ last week at [facility].  states sob has been worsening over time.  +fever, cough.  no other acute complaints at this time.  doesnt see a MD regularly.      pmh-denies   psh-denies   sochx-nonsmoker, denies alcohol, drug use.  lives at home   -------------------------------  4/26: Borderline between NRB and 7LNC, currently 0-% on 7L NC. No specific complaints.        MEDICATIONS  (STANDING):  dextrose 5%. 1000 milliLiter(s) (50 mL/Hr) IV Continuous <Continuous>  dextrose 50% Injectable 12.5 Gram(s) IV Push once  dextrose 50% Injectable 25 Gram(s) IV Push once  dextrose 50% Injectable 25 Gram(s) IV Push once  enoxaparin Injectable 40 milliGRAM(s) SubCutaneous daily  hydroxychloroquine   Oral   hydroxychloroquine 400 milliGRAM(s) Oral every 24 hours  methylPREDNISolone sodium succinate Injectable 70 milliGRAM(s) IV Push daily    MEDICATIONS  (PRN):  acetaminophen   Tablet .. 650 milliGRAM(s) Oral every 4 hours PRN Temp greater or equal to 38.5C (101.3F)  dextrose 40% Gel 15 Gram(s) Oral once PRN Blood Glucose LESS THAN 70 milliGRAM(s)/deciliter  glucagon  Injectable 1 milliGRAM(s) IntraMuscular once PRN Glucose LESS THAN 70 milligrams/deciliter      REVIEW OF SYSTEMS:    RESPIRATORY: No shortness of breath  CARDIOVASCULAR: No chest pain  All other review of systems is negative unless indicated above.    Vital Signs Last 24 Hrs  T(C): 36.8 (26 Apr 2020 11:05), Max: 37.3 (25 Apr 2020 15:23)  T(F): 98.2 (26 Apr 2020 11:05), Max: 99.2 (25 Apr 2020 15:23)  HR: 89 (26 Apr 2020 11:05) (83 - 91)  BP: 113/68 (26 Apr 2020 11:05) (113/68 - 132/78)  BP(mean): --  RR: 19 (26 Apr 2020 11:05) (16 - 22)  SpO2: 90% (26 Apr 2020 11:05) (90% - 98%)    PHYSICAL EXAM:    Constitutional: NAD, generally ill appeaaring  Respiratory: decreased breath sounds  Cardiovascular: S1 and S2, RRR  Gastrointestinal: BS+, soft, NT/ND  Extremities: No peripheral edema  Psychiatric: Normal mood, normal affect    LABS:                        13.5   2.98  )-----------( 217      ( 26 Apr 2020 06:36 )             40.0     04-26    140  |  108  |  17  ----------------------------<  147<H>  4.1   |  25  |  0.54    Ca    8.8      26 Apr 2020 06:36    TPro  6.5  /  Alb  2.4<L>  /  TBili  0.4  /  DBili  x   /  AST  63<H>  /  ALT  57  /  AlkPhos  84  04-26    PT/INR - ( 25 Apr 2020 14:28 )   PT: 12.2 sec;   INR: 1.10 ratio         PTT - ( 25 Apr 2020 14:28 )  PTT:31.5 sec  LIVER FUNCTIONS - ( 26 Apr 2020 06:36 )  Alb: 2.4 g/dL / Pro: 6.5 gm/dL / ALK PHOS: 84 U/L / ALT: 57 U/L / AST: 63 U/L / GGT: x             RADIOLOGY & ADDITIONAL STUDIES:

## 2020-04-27 NOTE — PROGRESS NOTE ADULT - ASSESSMENT
Imp:   Acute hypoxic respiratory failure due to covid19    #Covid  - Cont plaquenil, qt interval <500  - Cont solumedrol, follow BGMS  - trend inflammatory markers, recheck in 3 days, worsen if clinically indicated   - o2 support, titrate as needed  - albuterol as needed    dvt prophylaxis  -lovenox sc    advance directives  -full code

## 2020-04-27 NOTE — CDI QUERY NOTE - NSCDIOTHERTXTBX_GEN_ALL_CORE_HH
Clinical documentation indicates that this patient has ______COVID 19 with acute hypoxic respiratory failure______.  Radiology report of CXR states "Airspace and interstitial infiltrates in the mid and lower lung fields bilaterally greatest in the lateral right mid lung field consistent with multifocal pneumonia. Findings suspicious for viral/atypical pneumonia including Covid 19 infection. "  Please clarify the status of PNA in the medical record:   A. pneumonia associated with COVID 19 present  B. PNA ruled out  C. other (specify)  D. not clinically significant    Present on Admission:Was the severity of the condition present on admission?  If so, please document in the chart that “(the condition) was present on admission.”In responding to this request, please exercise your independent professional judgment.  The fact that a question is asked does not imply that any particular answer is desired or expected. Documentation clarification is required for compliance, accuracy in coding and billing, and reporting severity of illness, quality data   and risk of mortality.

## 2020-04-27 NOTE — PROGRESS NOTE ADULT - SUBJECTIVE AND OBJECTIVE BOX
75 y/o female presents to the ED c/o worsening SOB, cough, fever, myalgia x2 weeks.  states was tested COVID+ last week at [facility].  states sob has been worsening over time.  +fever, cough.  no other acute complaints at this time.  Patient admitted with acute hypoxic respiratory failure due to [id number] infection.    4/27- patient was seen and examined. Noted to be short of breath on NRBM. +cough. GONZALEZ when ambulating to the bathroom.     Vital Signs Last 24 Hrs  T(C): 36.2 (27 Apr 2020 12:27), Max: 36.8 (26 Apr 2020 17:50)  T(F): 97.2 (27 Apr 2020 12:27), Max: 98.3 (26 Apr 2020 17:50)  HR: 95 (27 Apr 2020 12:27) (81 - 95)  BP: 124/77 (27 Apr 2020 12:27) (124/77 - 135/84)  BP(mean): 94 (27 Apr 2020 12:27) (94 - 94)  RR: 24 (27 Apr 2020 12:27) (18 - 24)  SpO2: 95% (27 Apr 2020 12:27) (95% - 99%)    REVIEW OF SYSTEMS:    CONSTITUTIONAL: No weakness, fevers or chills  EYES/ENT: No visual changes;  No vertigo or throat pain   NECK: No pain or stiffness  RESPIRATORY: + cough, no wheezing, hemoptysis;  + shortness of breath  CARDIOVASCULAR: No chest pain or palpitations  GASTROINTESTINAL: No abdominal or epigastric pain. No nausea, vomiting, or hematemesis; No diarrhea or constipation. No melena or hematochezia.  GENITOURINARY: No dysuria, frequency or hematuria  NEUROLOGICAL: No numbness or weakness  SKIN: No itching, burning, rashes, or lesions   All other review of systems is negative unless indicated above.    PE:  Constitutional: NAD, laying in bed- on NRBM  HEENT: NC/AT  Back: no tenderness  Respiratory: diminished at the base.   Cardiovascular: S1S2 regular, no murmurs  Abdomen: soft, not tender, not distended, positive BS  Genitourinary: voiding    04-27    139  |  106  |  29<H>  ----------------------------<  144<H>  4.4   |  23  |  0.78    Ca    8.9      27 Apr 2020 08:51    TPro  6.5  /  Alb  2.4<L>  /  TBili  0.4  /  DBili  x   /  AST  63<H>  /  ALT  57  /  AlkPhos  84  04-26                        13.4   8.23  )-----------( 303      ( 27 Apr 2020 08:51 )             39.5     MEDICATIONS  (STANDING):  dextrose 5%. 1000 milliLiter(s) (50 mL/Hr) IV Continuous <Continuous>  dextrose 50% Injectable 12.5 Gram(s) IV Push once  dextrose 50% Injectable 25 Gram(s) IV Push once  dextrose 50% Injectable 25 Gram(s) IV Push once  enoxaparin Injectable 40 milliGRAM(s) SubCutaneous daily  hydroxychloroquine   Oral   hydroxychloroquine 400 milliGRAM(s) Oral every 24 hours  methylPREDNISolone sodium succinate Injectable 70 milliGRAM(s) IV Push daily    MEDICATIONS  (PRN):  acetaminophen   Tablet .. 650 milliGRAM(s) Oral every 4 hours PRN Temp greater or equal to 38.5C (101.3F)  dextrose 40% Gel 15 Gram(s) Oral once PRN Blood Glucose LESS THAN 70 milliGRAM(s)/deciliter  glucagon  Injectable 1 milliGRAM(s) IntraMuscular once PRN Glucose LESS THAN 70 milligrams/deciliter      < from: Xray Chest 1 View-PORTABLE IMMEDIATE (04.25.20 @ 14:33) >  EXAM:  XR CHEST PORTABLE IMMED 1V                            PROCEDURE DATE:  04/25/2020          INTERPRETATION:    Examination: XR CHEST IMMEDIATE    History: , Shortness of Breath, Cough,  Fever    Findings:  Airspace and interstitial infiltrates in the mid and lower lung fields bilaterally greatest in the lateral right mid lung field consistent with multifocal pneumonia. Findings suspicious for viral/atypical pneumonia including Covid 19 infection. No pleural effusion. Heart magnified by projection. Atherosclerotic aorta.    Impression: Multifocal pneumonia as described    < end of copied text >

## 2020-04-28 NOTE — PROGRESS NOTE ADULT - SUBJECTIVE AND OBJECTIVE BOX
73 y/o female presents to the ED c/o worsening SOB, cough, fever, myalgia x2 weeks.  states was tested COVID+ last week at [facility].  states sob has been worsening over time.  +fever, cough.  no other acute complaints at this time.  Patient admitted with acute hypoxic respiratory failure due to [id number] infection.    4/28    Vital Signs Last 24 Hrs  T(C): 36.3 (28 Apr 2020 04:45), Max: 36.5 (27 Apr 2020 11:52)  T(F): 97.3 (28 Apr 2020 04:45), Max: 97.7 (27 Apr 2020 11:52)  HR: 88 (28 Apr 2020 04:45) (81 - 101)  BP: 149/76 (28 Apr 2020 04:45) (124/77 - 150/85)  BP(mean): 94 (27 Apr 2020 12:27) (94 - 94)  RR: 22 (28 Apr 2020 04:45) (18 - 24)  SpO2: 93% (28 Apr 2020 04:45) (93% - 97%)      REVIEW OF SYSTEMS:    CONSTITUTIONAL: No weakness, fevers or chills  EYES/ENT: No visual changes;  No vertigo or throat pain   NECK: No pain or stiffness  RESPIRATORY: + cough, no wheezing, hemoptysis;  + shortness of breath  CARDIOVASCULAR: No chest pain or palpitations  GASTROINTESTINAL: No abdominal or epigastric pain. No nausea, vomiting, or hematemesis; No diarrhea or constipation. No melena or hematochezia.  GENITOURINARY: No dysuria, frequency or hematuria  NEUROLOGICAL: No numbness or weakness  SKIN: No itching, burning, rashes, or lesions   All other review of systems is negative unless indicated above.    PE:  Constitutional: NAD, laying in bed- on NRBM  HEENT: NC/AT  Back: no tenderness  Respiratory: diminished at the base.   Cardiovascular: S1S2 regular, no murmurs  Abdomen: soft, not tender, not distended, positive BS  Genitourinary: voiding          04-27    139  |  106  |  29<H>  ----------------------------<  144<H>  4.4   |  23  |  0.78    Ca    8.9      27 Apr 2020 08:51    TPro  6.5  /  Alb  2.4<L>  /  TBili  0.4  /  DBili  x   /  AST  63<H>  /  ALT  57  /  AlkPhos  84  04-26                        13.4   8.23  )-----------( 303      ( 27 Apr 2020 08:51 )             39.5     MEDICATIONS  (STANDING):  dextrose 5%. 1000 milliLiter(s) (50 mL/Hr) IV Continuous <Continuous>  dextrose 50% Injectable 12.5 Gram(s) IV Push once  dextrose 50% Injectable 25 Gram(s) IV Push once  dextrose 50% Injectable 25 Gram(s) IV Push once  enoxaparin Injectable 40 milliGRAM(s) SubCutaneous daily  hydroxychloroquine   Oral   hydroxychloroquine 400 milliGRAM(s) Oral every 24 hours  methylPREDNISolone sodium succinate Injectable 70 milliGRAM(s) IV Push daily    MEDICATIONS  (PRN):  acetaminophen   Tablet .. 650 milliGRAM(s) Oral every 4 hours PRN Temp greater or equal to 38.5C (101.3F)  dextrose 40% Gel 15 Gram(s) Oral once PRN Blood Glucose LESS THAN 70 milliGRAM(s)/deciliter  glucagon  Injectable 1 milliGRAM(s) IntraMuscular once PRN Glucose LESS THAN 70 milligrams/deciliter      < from: Xray Chest 1 View-PORTABLE IMMEDIATE (04.25.20 @ 14:33) >  EXAM:  XR CHEST PORTABLE IMMED 1V                            PROCEDURE DATE:  04/25/2020          INTERPRETATION:    Examination: XR CHEST IMMEDIATE    History: , Shortness of Breath, Cough,  Fever    Findings:  Airspace and interstitial infiltrates in the mid and lower lung fields bilaterally greatest in the lateral right mid lung field consistent with multifocal pneumonia. Findings suspicious for viral/atypical pneumonia including Covid 19 infection. No pleural effusion. Heart magnified by projection. Atherosclerotic aorta.    Impression: Multifocal pneumonia as described    < end of copied text > 75 y/o female presents to the ED c/o worsening SOB, cough, fever, myalgia x2 weeks.  states was tested COVID+ last week at [facility].  states sob has been worsening over time.  +fever, cough.  no other acute complaints at this time.  Patient admitted with acute hypoxic respiratory failure due to [id number] infection.    4/28- patient was seen and examined. Still on NRBM- stated that she felt better than yesterday    Vital Signs Last 24 Hrs  T(C): 36.3 (28 Apr 2020 04:45), Max: 36.5 (27 Apr 2020 11:52)  T(F): 97.3 (28 Apr 2020 04:45), Max: 97.7 (27 Apr 2020 11:52)  HR: 88 (28 Apr 2020 04:45) (81 - 101)  BP: 149/76 (28 Apr 2020 04:45) (124/77 - 150/85)  BP(mean): 94 (27 Apr 2020 12:27) (94 - 94)  RR: 22 (28 Apr 2020 04:45) (18 - 24)  SpO2: 93% (28 Apr 2020 04:45) (93% - 97%)      REVIEW OF SYSTEMS:    CONSTITUTIONAL: No weakness, fevers or chills  EYES/ENT: No visual changes;  No vertigo or throat pain   NECK: No pain or stiffness  RESPIRATORY: + cough, no wheezing, hemoptysis;  + shortness of breath  CARDIOVASCULAR: No chest pain or palpitations  GASTROINTESTINAL: No abdominal or epigastric pain. No nausea, vomiting, or hematemesis; No diarrhea or constipation. No melena or hematochezia.  GENITOURINARY: No dysuria, frequency or hematuria  NEUROLOGICAL: No numbness or weakness  SKIN: No itching, burning, rashes, or lesions   All other review of systems is negative unless indicated above.    PE:  Constitutional: NAD, laying in bed- on NRBM  HEENT: NC/AT  Back: no tenderness  Respiratory: diminished at the base.   Cardiovascular: S1S2 regular, no murmurs  Abdomen: soft, not tender, not distended, positive BS  Genitourinary: voiding      04-28    140  |  105  |  25<H>  ----------------------------<  130<H>  4.4   |  25  |  0.66    Ca    9.1      28 Apr 2020 08:03                          13.8   7.77  )-----------( 228      ( 28 Apr 2020 08:03 )             41.4       04-27    139  |  106  |  29<H>  ----------------------------<  144<H>  4.4   |  23  |  0.78    Ca    8.9      27 Apr 2020 08:51    TPro  6.5  /  Alb  2.4<L>  /  TBili  0.4  /  DBili  x   /  AST  63<H>  /  ALT  57  /  AlkPhos  84  04-26                        13.4   8.23  )-----------( 303      ( 27 Apr 2020 08:51 )             39.5     MEDICATIONS  (STANDING):  dextrose 5%. 1000 milliLiter(s) (50 mL/Hr) IV Continuous <Continuous>  dextrose 50% Injectable 12.5 Gram(s) IV Push once  dextrose 50% Injectable 25 Gram(s) IV Push once  dextrose 50% Injectable 25 Gram(s) IV Push once  enoxaparin Injectable 40 milliGRAM(s) SubCutaneous daily  hydroxychloroquine   Oral   hydroxychloroquine 400 milliGRAM(s) Oral every 24 hours  methylPREDNISolone sodium succinate Injectable 70 milliGRAM(s) IV Push daily    MEDICATIONS  (PRN):  acetaminophen   Tablet .. 650 milliGRAM(s) Oral every 4 hours PRN Temp greater or equal to 38.5C (101.3F)  dextrose 40% Gel 15 Gram(s) Oral once PRN Blood Glucose LESS THAN 70 milliGRAM(s)/deciliter  glucagon  Injectable 1 milliGRAM(s) IntraMuscular once PRN Glucose LESS THAN 70 milligrams/deciliter      < from: Xray Chest 1 View-PORTABLE IMMEDIATE (04.25.20 @ 14:33) >  EXAM:  XR CHEST PORTABLE IMMED 1V                            PROCEDURE DATE:  04/25/2020          INTERPRETATION:    Examination: XR CHEST IMMEDIATE    History: , Shortness of Breath, Cough,  Fever    Findings:  Airspace and interstitial infiltrates in the mid and lower lung fields bilaterally greatest in the lateral right mid lung field consistent with multifocal pneumonia. Findings suspicious for viral/atypical pneumonia including Covid 19 infection. No pleural effusion. Heart magnified by projection. Atherosclerotic aorta.    Impression: Multifocal pneumonia as described    < end of copied text >

## 2020-04-28 NOTE — PROGRESS NOTE ADULT - ASSESSMENT
Imp:   *Acute hypoxic respiratory failure due to covid19  *Multifocal Pneumonia associated with COVID19 infection- POA  - CXRAY results noted  - monitor sats  - Cont plaquenil, qt interval <500  - Cont solumedrol, follow BGMS  - inflammatory markers elevated- trend   - o2 support, titrate as needed  - albuterol as needed  - check lytes  - monitor for s/e of plaquenil    *dvt prophylaxis  -lovenox sc    advance directives  -full code Imp:   *Acute hypoxic respiratory failure due to covid19  *Multifocal Pneumonia associated with COVID19 infection- POA  - CXRAY results noted  - monitor sats  - Cont solumedrol, follow BGMS  - inflammatory markers elevated- trend   - o2 support, titrate as needed  - albuterol as needed  - check lytes  - monitor for s/e of plaquenil    *dvt prophylaxis  -lovenox sc    advance directives  -full code

## 2020-04-29 NOTE — DIETITIAN INITIAL EVALUATION ADULT. - ADD RECOMMEND
Monitor PO intake and tolerance to diet. Monitor labs. Monitor weights. Recommend MVI, Vitamin C daily.

## 2020-04-29 NOTE — DIETITIAN INITIAL EVALUATION ADULT. - PERTINENT MEDS FT
MEDICATIONS  (STANDING):  ALBUTerol    90 MICROgram(s) HFA Inhaler 2 Puff(s) Inhalation every 4 hours  dextrose 5%. 1000 milliLiter(s) (50 mL/Hr) IV Continuous <Continuous>  dextrose 50% Injectable 12.5 Gram(s) IV Push once  dextrose 50% Injectable 25 Gram(s) IV Push once  dextrose 50% Injectable 25 Gram(s) IV Push once  enoxaparin Injectable 40 milliGRAM(s) SubCutaneous daily  guaiFENesin  milliGRAM(s) Oral every 12 hours  methylPREDNISolone sodium succinate Injectable 70 milliGRAM(s) IV Push daily    MEDICATIONS  (PRN):  acetaminophen   Tablet .. 650 milliGRAM(s) Oral every 4 hours PRN Temp greater or equal to 38.5C (101.3F)  benzonatate 100 milliGRAM(s) Oral every 8 hours PRN Cough  dextrose 40% Gel 15 Gram(s) Oral once PRN Blood Glucose LESS THAN 70 milliGRAM(s)/deciliter  glucagon  Injectable 1 milliGRAM(s) IntraMuscular once PRN Glucose LESS THAN 70 milligrams/deciliter

## 2020-04-29 NOTE — DIETITIAN INITIAL EVALUATION ADULT. - OTHER INFO
73 y/o female presents to the ED c/o worsening SOB, cough, fever, myalgia x2 weeks.  Multiple attempts made, unable to interview pt at this time. PO intake not documented, continue to monitor and encourage PO intake and HBV protein. No edema noted. Monitor labs. RD to remain available.

## 2020-04-29 NOTE — PROGRESS NOTE ADULT - ASSESSMENT
*Acute hypoxic respiratory failure due to covid19  *Multifocal Pneumonia associated with COVID19 infection- POA  - CXRAY results noted  - monitor sats  - Cont solumedrol, follow BGMS  - inflammatory markers elevated- trend   - o2 support, titrate as needed  - albuterol as needed  - Lasix 40mg x1  - patient still on maximum NRBM support- will attempt to titrate O2 as per patient tolerance    *dvt prophylaxis  -lovenox sc    advance directives  -full code      Patient wants to go home- explained that she is not yet optimized for discharge as she is still requiring high O2 concentrations to keep her O2 sat up. She verbalized understanding. *Acute hypoxic respiratory failure due to covid19  *Multifocal Pneumonia associated with COVID19 infection- POA  - CXRAY results noted  - monitor sats  - Cont solumedrol, follow BGMS  - inflammatory markers elevated- trend   - o2 support, titrate as needed  - albuterol as needed  - trial of Lasix 40mg x1  - patient still on maximum NRBM support- will attempt to titrate O2 as per patient tolerance    *dvt prophylaxis  -lovenox sc    advance directives  -full code      Patient wants to go home- explained that she is not yet optimized for discharge as she is still requiring high O2 concentrations to keep her O2 sat up. She verbalized understanding.

## 2020-04-29 NOTE — PROGRESS NOTE ADULT - SUBJECTIVE AND OBJECTIVE BOX
75 y/o female presents to the ED c/o worsening SOB, cough, fever, myalgia x2 weeks.  states was tested COVID+ last week at [facility].  states sob has been worsening over time.  +fever, cough.  no other acute complaints at this time.  Patient admitted with acute hypoxic respiratory failure due to [id number] infection.    4/29- patient was seen and examined. On NRBM saturating 94%- encouraged to self prone. Still a little short of breath, occasional cough.    Vital Signs Last 24 Hrs  T(C): 36.5 (29 Apr 2020 05:58), Max: 36.5 (29 Apr 2020 05:58)  T(F): 97.7 (29 Apr 2020 05:58), Max: 97.7 (29 Apr 2020 05:58)  HR: 88 (29 Apr 2020 05:58) (77 - 91)  BP: 150/79 (29 Apr 2020 05:58) (134/75 - 150/79)  BP(mean): --  RR: 19 (29 Apr 2020 05:58) (19 - 30)  SpO2: 94% (29 Apr 2020 05:58) (93% - 95%)    REVIEW OF SYSTEMS:    CONSTITUTIONAL: No weakness, fevers or chills  EYES/ENT: No visual changes;  No vertigo or throat pain   NECK: No pain or stiffness  RESPIRATORY: No cough, wheezing, hemoptysis; No shortness of breath  CARDIOVASCULAR: No chest pain or palpitations  GASTROINTESTINAL: No abdominal or epigastric pain. No nausea, vomiting, or hematemesis; No diarrhea or constipation. No melena or hematochezia.  GENITOURINARY: No dysuria, frequency or hematuria  NEUROLOGICAL: No numbness or weakness  SKIN: No itching, burning, rashes, or lesions   All other review of systems is negative unless indicated above.    PE:  Constitutional: NAD, laying in bed- on NRBM  HEENT: NC/AT  Back: no tenderness  Respiratory: diminished at the base.   Cardiovascular: S1S2 regular, no murmurs  Abdomen: soft, not tender, not distended, positive BS  Genitourinary: voiding    04-28    140  |  105  |  25<H>  ----------------------------<  130<H>  4.4   |  25  |  0.66    Ca    9.1      28 Apr 2020 08:03                          13.8   7.77  )-----------( 228      ( 28 Apr 2020 08:03 )             41.4     MEDICATIONS  (STANDING):  ALBUTerol    90 MICROgram(s) HFA Inhaler 2 Puff(s) Inhalation every 4 hours  dextrose 5%. 1000 milliLiter(s) (50 mL/Hr) IV Continuous <Continuous>  dextrose 50% Injectable 12.5 Gram(s) IV Push once  dextrose 50% Injectable 25 Gram(s) IV Push once  dextrose 50% Injectable 25 Gram(s) IV Push once  enoxaparin Injectable 40 milliGRAM(s) SubCutaneous daily  furosemide   Injectable 40 milliGRAM(s) IV Push once  guaiFENesin  milliGRAM(s) Oral every 12 hours  methylPREDNISolone sodium succinate Injectable 70 milliGRAM(s) IV Push daily    MEDICATIONS  (PRN):  acetaminophen   Tablet .. 650 milliGRAM(s) Oral every 4 hours PRN Temp greater or equal to 38.5C (101.3F)  benzonatate 100 milliGRAM(s) Oral every 8 hours PRN Cough  dextrose 40% Gel 15 Gram(s) Oral once PRN Blood Glucose LESS THAN 70 milliGRAM(s)/deciliter  glucagon  Injectable 1 milliGRAM(s) IntraMuscular once PRN Glucose LESS THAN 70 milligrams/deciliter      < from: Xray Chest 1 View-PORTABLE IMMEDIATE (04.25.20 @ 14:33) >  EXAM:  XR CHEST PORTABLE IMMED 1V                            PROCEDURE DATE:  04/25/2020          INTERPRETATION:    Examination: XR CHEST IMMEDIATE    History: , Shortness of Breath, Cough,  Fever    Findings:  Airspace and interstitial infiltrates in the mid and lower lung fields bilaterally greatest in the lateral right mid lung field consistent with multifocal pneumonia. Findings suspicious for viral/atypical pneumonia including Covid 19 infection. No pleural effusion. Heart magnified by projection. Atherosclerotic aorta.    Impression: Multifocal pneumonia as described

## 2020-04-30 NOTE — PROGRESS NOTE ADULT - SUBJECTIVE AND OBJECTIVE BOX
75 y/o female presents to the ED c/o worsening SOB, cough, fever, myalgia x2 weeks.  states was tested COVID+ last week at [facility].  states sob has been worsening over time.  +fever, cough.  no other acute complaints at this time.  Patient admitted with acute hypoxic respiratory failure due to [id number] infection.    4/30- titrated down to 6lpm on nasal cannula. stated that she feels better this morning. wants to go home .     Vital Signs Last 24 Hrs  T(C): 36.4 (30 Apr 2020 05:38), Max: 36.6 (29 Apr 2020 21:05)  T(F): 97.6 (30 Apr 2020 05:38), Max: 97.8 (29 Apr 2020 21:05)  HR: 84 (30 Apr 2020 05:38) (83 - 90)  BP: 131/68 (30 Apr 2020 05:38) (131/68 - 131/75)  BP(mean): --  RR: 18 (29 Apr 2020 21:05) (18 - 18)  SpO2: 95% (30 Apr 2020 06:06) (90% - 97%)    REVIEW OF SYSTEMS:    CONSTITUTIONAL: No weakness, fevers or chills  EYES/ENT: No visual changes;  No vertigo or throat pain   NECK: No pain or stiffness  RESPIRATORY: No cough, wheezing, hemoptysis; No shortness of breath  CARDIOVASCULAR: No chest pain or palpitations  GASTROINTESTINAL: No abdominal or epigastric pain. No nausea, vomiting, or hematemesis; No diarrhea or constipation. No melena or hematochezia.  GENITOURINARY: No dysuria, frequency or hematuria  NEUROLOGICAL: No numbness or weakness  SKIN: No itching, burning, rashes, or lesions   All other review of systems is negative unless indicated above.    PE:  Constitutional: NAD, laying in bed- on NRBM  HEENT: NC/AT  Back: no tenderness  Respiratory: diminished at the base.   Cardiovascular: S1S2 regular, no murmurs  Abdomen: soft, not tender, not distended, positive BS  Genitourinary: voiding    MEDICATIONS  (STANDING):  ALBUTerol    90 MICROgram(s) HFA Inhaler 2 Puff(s) Inhalation every 4 hours  dextrose 5%. 1000 milliLiter(s) (50 mL/Hr) IV Continuous <Continuous>  dextrose 50% Injectable 12.5 Gram(s) IV Push once  dextrose 50% Injectable 25 Gram(s) IV Push once  dextrose 50% Injectable 25 Gram(s) IV Push once  enoxaparin Injectable 40 milliGRAM(s) SubCutaneous daily  guaiFENesin  milliGRAM(s) Oral every 12 hours  methylPREDNISolone sodium succinate Injectable 70 milliGRAM(s) IV Push daily    MEDICATIONS  (PRN):  acetaminophen   Tablet .. 650 milliGRAM(s) Oral every 4 hours PRN Temp greater or equal to 38.5C (101.3F)  benzonatate 100 milliGRAM(s) Oral every 8 hours PRN Cough  dextrose 40% Gel 15 Gram(s) Oral once PRN Blood Glucose LESS THAN 70 milliGRAM(s)/deciliter  glucagon  Injectable 1 milliGRAM(s) IntraMuscular once PRN Glucose LESS THAN 70 milligrams/deciliter

## 2020-04-30 NOTE — PROGRESS NOTE ADULT - ASSESSMENT
*Acute hypoxic respiratory failure due to covid19  *Multifocal Pneumonia associated with COVID19 infection- POA  - CXRAY results noted  - monitor sats  - Cont solumedrol, follow BGMS  - inflammatory markers elevated- trend   - o2 support, titrate as needed  - albuterol as needed  - 4/30 - trial of Lasix 40mg x1; monitor creatinine  - patient still on maximum NRBM support- will attempt to titrate O2 as per patient tolerance- Now on nasal cannula    *dvt prophylaxis  -lovenox sc    advance directives  -full code      Patient wants to go home- explained that she is not yet optimized for discharge as she is still requiring high O2 concentrations to keep her O2 sat up. She verbalized understanding.

## 2020-05-01 NOTE — DISCHARGE NOTE NURSING/CASE MANAGEMENT/SOCIAL WORK - PATIENT PORTAL LINK FT
You can access the FollowMyHealth Patient Portal offered by [facility] by registering at the following website: [email or web address]. By joining [facility]’s FollowMyHealth portal, you will also be able to view your health information using other applications (apps) compatible with our system.

## 2020-05-01 NOTE — PROGRESS NOTE ADULT - ASSESSMENT
Assessment and Plan:   · Assessment		  *Acute hypoxic respiratory failure due to covid19  *Multifocal Pneumonia associated with COVID19 infection- POA  - CXRAY results noted  - monitor sats  - Cont solumedrol, follow BGMS  - inflammatory markers elevated- trend   - o2 support, titrate as needed  - albuterol as needed  - 5/1- trial of Lasix 40mg #3 ; monitor creatinine 0.60   - will attempt to titrate O2 as per patient tolerance- Now on nasal cannula    *dvt prophylaxis  -lovenox sc    advance directives  -full code      Possible dc in 24 hours.

## 2020-05-01 NOTE — PROGRESS NOTE ADULT - ATTENDING COMMENTS
follow d dimer ferritin and crp in am  lasix 40 x1 today, check bmp in am  trial of weaning from 6 L NC today

## 2020-05-01 NOTE — PROGRESS NOTE ADULT - SUBJECTIVE AND OBJECTIVE BOX
73 y/o female presents to the ED c/o worsening SOB, cough, fever, myalgia x2 weeks.  states was tested COVID+ last week at [facility].  states sob has been worsening over time.  +fever, cough.  no other acute complaints at this time.  Patient admitted with acute hypoxic respiratory failure due to [id number] infection.    4/30- titrated down to 6lpm on nasal cannula. stated that she feels better this morning. wants to go home .   5/1- NC down to 4L- doing well with her breathing as per her report- complaining that the lasix makes her urinate too many times- rationale for lasix administration explained to patient.     Vital Signs Last 24 Hrs  T(C): 36.6 (01 May 2020 05:20), Max: 37 (30 Apr 2020 21:03)  T(F): 97.8 (01 May 2020 05:20), Max: 98.6 (30 Apr 2020 21:03)  HR: 81 (01 May 2020 05:20) (81 - 103)  BP: 117/70 (01 May 2020 05:20) (117/70 - 128/78)  BP(mean): --  RR: 19 (01 May 2020 05:20) (18 - 19)  SpO2: 98% (01 May 2020 05:20) (92% - 98%)    REVIEW OF SYSTEMS:    CONSTITUTIONAL: No weakness, fevers or chills  EYES/ENT: No visual changes;  No vertigo or throat pain   NECK: No pain or stiffness  RESPIRATORY: No cough, wheezing, hemoptysis; No shortness of breath  CARDIOVASCULAR: No chest pain or palpitations  GASTROINTESTINAL: No abdominal or epigastric pain. No nausea, vomiting, or hematemesis; No diarrhea or constipation. No melena or hematochezia.  GENITOURINARY: No dysuria, frequency or hematuria  NEUROLOGICAL: No numbness or weakness  SKIN: No itching, burning, rashes, or lesions   All other review of systems is negative unless indicated above.    PE:  Constitutional: NAD, laying in bed- on NRBM  HEENT: NC/AT  Back: no tenderness  Respiratory: diminished at the base.   Cardiovascular: S1S2 regular, no murmurs  Abdomen: soft, not tender, not distended, positive BS  Genitourinary: voiding    MEDICATIONS  (STANDING):  ALBUTerol    90 MICROgram(s) HFA Inhaler 2 Puff(s) Inhalation every 4 hours  dextrose 5%. 1000 milliLiter(s) (50 mL/Hr) IV Continuous <Continuous>  dextrose 50% Injectable 12.5 Gram(s) IV Push once  dextrose 50% Injectable 25 Gram(s) IV Push once  dextrose 50% Injectable 25 Gram(s) IV Push once  enoxaparin Injectable 40 milliGRAM(s) SubCutaneous daily  guaiFENesin  milliGRAM(s) Oral every 12 hours  methylPREDNISolone sodium succinate Injectable 70 milliGRAM(s) IV Push daily    MEDICATIONS  (PRN):  acetaminophen   Tablet .. 650 milliGRAM(s) Oral every 4 hours PRN Temp greater or equal to 38.5C (101.3F)  benzonatate 100 milliGRAM(s) Oral every 8 hours PRN Cough  dextrose 40% Gel 15 Gram(s) Oral once PRN Blood Glucose LESS THAN 70 milliGRAM(s)/deciliter  glucagon  Injectable 1 milliGRAM(s) IntraMuscular once PRN Glucose LESS THAN 70 milligrams/deciliter      Assessment and Plan:   · Assessment		  *Acute hypoxic respiratory failure due to covid19  *Multifocal Pneumonia associated with COVID19 infection- POA  - CXRAY results noted  - monitor sats  - Cont solumedrol, follow BGMS  - inflammatory markers elevated- trend   - o2 support, titrate as needed  - albuterol as needed  - 5/1- trial of Lasix 40mg #3 ; monitor creatinine 0.60   - will attempt to titrate O2 as per patient tolerance- Now on nasal cannula    *dvt prophylaxis  -lovenox sc    advance directives  -full code      Possible dc in 24 hours. 75 y/o female presents to the ED c/o worsening SOB, cough, fever, myalgia x2 weeks.  states was tested COVID+ last week at [facility].  states sob has been worsening over time.  +fever, cough.  no other acute complaints at this time.  Patient admitted with acute hypoxic respiratory failure due to [id number] infection.    4/30- titrated down to 6lpm on nasal cannula. stated that she feels better this morning. wants to go home .   5/1- NC down to 4L- doing well with her breathing as per her report- complaining that the lasix makes her urinate too many times- rationale for lasix administration explained to patient.     Vital Signs Last 24 Hrs  T(C): 36.6 (01 May 2020 05:20), Max: 37 (30 Apr 2020 21:03)  T(F): 97.8 (01 May 2020 05:20), Max: 98.6 (30 Apr 2020 21:03)  HR: 81 (01 May 2020 05:20) (81 - 103)  BP: 117/70 (01 May 2020 05:20) (117/70 - 128/78)  BP(mean): --  RR: 19 (01 May 2020 05:20) (18 - 19)  SpO2: 98% (01 May 2020 05:20) (92% - 98%)    REVIEW OF SYSTEMS:    CONSTITUTIONAL: No weakness, fevers or chills  EYES/ENT: No visual changes;  No vertigo or throat pain   NECK: No pain or stiffness  RESPIRATORY: No cough, wheezing, hemoptysis; No shortness of breath  CARDIOVASCULAR: No chest pain or palpitations  GASTROINTESTINAL: No abdominal or epigastric pain. No nausea, vomiting, or hematemesis; No diarrhea or constipation. No melena or hematochezia.  GENITOURINARY: No dysuria, frequency or hematuria  NEUROLOGICAL: No numbness or weakness  SKIN: No itching, burning, rashes, or lesions   All other review of systems is negative unless indicated above.    PE:  Constitutional: NAD, laying in bed- on NRBM  HEENT: NC/AT  Back: no tenderness  Respiratory: diminished at the base.   Cardiovascular: S1S2 regular, no murmurs  Abdomen: soft, not tender, not distended, positive BS  Genitourinary: voiding    MEDICATIONS  (STANDING):  ALBUTerol    90 MICROgram(s) HFA Inhaler 2 Puff(s) Inhalation every 4 hours  dextrose 5%. 1000 milliLiter(s) (50 mL/Hr) IV Continuous <Continuous>  dextrose 50% Injectable 12.5 Gram(s) IV Push once  dextrose 50% Injectable 25 Gram(s) IV Push once  dextrose 50% Injectable 25 Gram(s) IV Push once  enoxaparin Injectable 40 milliGRAM(s) SubCutaneous daily  guaiFENesin  milliGRAM(s) Oral every 12 hours  methylPREDNISolone sodium succinate Injectable 70 milliGRAM(s) IV Push daily    MEDICATIONS  (PRN):  acetaminophen   Tablet .. 650 milliGRAM(s) Oral every 4 hours PRN Temp greater or equal to 38.5C (101.3F)  benzonatate 100 milliGRAM(s) Oral every 8 hours PRN Cough  dextrose 40% Gel 15 Gram(s) Oral once PRN Blood Glucose LESS THAN 70 milliGRAM(s)/deciliter  glucagon  Injectable 1 milliGRAM(s) IntraMuscular once PRN Glucose LESS THAN 70 milligrams/deciliter

## 2020-05-01 NOTE — CHART NOTE - NSCHARTNOTEFT_GEN_A_CORE
patient with acute diagnosis of covid 19 requiring home O2 to increase  hospital capacity  and mitigate risks

## 2020-05-02 NOTE — DISCHARGE NOTE PROVIDER - NSDCMRMEDTOKEN_GEN_ALL_CORE_FT
ondansetron 4 mg oral tablet, disintegratin tab(s) orally 3 times a day, As Needed x 5 days albuterol 90 mcg/inh inhalation aerosol: 2 puff(s) inhaled every 4 hours, As Needed for wheezing  guaiFENesin 600 mg oral tablet, extended release: 1 tab(s) orally every 12 hours, As Needed   Xarelto 10 mg oral tablet: 1 tab(s) orally once a day

## 2020-05-02 NOTE — DISCHARGE NOTE PROVIDER - PROVIDER TOKENS
FREE:[LAST:[pmd],PHONE:[(   )    -],FAX:[(   )    -],ADDRESS:[primary doctor]],PROVIDER:[TOKEN:[[id number]]] FREE:[LAST:[pmd],PHONE:[(   )    -],FAX:[(   )    -],ADDRESS:[primary doctor]],PROVIDER:[TOKEN:[[id number]]]

## 2020-05-02 NOTE — DISCHARGE NOTE PROVIDER - HOSPITAL COURSE
73 y/o female presents to the ED c/o worsening SOB, cough, fever, myalgia x2 weeks.  states was tested COVID+ last week at [facility].  states sob has been worsening over time.  +fever, cough.  no other acute complaints at this time.    Patient admitted with acute hypoxic respiratory failure due to [id number] infection.        4/30- titrated down to 6lpm on nasal cannula. stated that she feels better this morning. wants to go home .     5/1- NC down to 4L- doing well with her breathing as per her report- complaining that the lasix makes her urinate too many times- rationale for lasix administration explained to patient. PE:    Constitutional: NAD, laying in bed- on NRBM    HEENT: NC/AT    Back: no tendernessRespiratory: diminished at the base.     Cardiovascular: S1S2 regular, no murmurs    Abdomen: soft, not tender, not distended, positive BS    Genitourinary: voiding*Acute hypoxic respiratory failure due to covid19    *Multifocal Pneumonia associated with COVID19 infection- POA    - CXRAY results noted    - monitor sats    - Cont solumedrol, follow BGMS    - inflammatory markers elevated- trend     - o2 support, titrate as needed    - albuterol as needed    - 5/1- trial of Lasix 40mg #3 ; monitor creatinine 0.60     - will attempt to titrate O2 as per patient tolerance- Now on nasal cannula        *dvt prophylaxis    -lovenox sc        advance directives    -full code      Possible dc in 24 hours. 73 y/o female presents to the ED c/o worsening SOB, cough, fever, myalgia x2 weeks.  states was tested COVID+ last week at [facility].  states sob has been worsening over time.  +fever, cough.  no other acute complaints at this time.    Patient admitted with acute hypoxic respiratory failure due to [id number] infection.        4/30- titrated down to 6lpm on nasal cannula. stated that she feels better this morning. wants to go home .     5/1- NC down to 4L- doing well with her breathing as per her report- complaining that the lasix makes her urinate too many times- rationale for lasix administration explained to pt     patient.    5/2- pt on 5L NC 93-96%, will check on 4 l NC, patient reports no SOB at rest on 5 L NC , eager to go home and says will be disappointed if cant go today         PE:    Constitutional: NAD, laying in bed- on 5 L NC    HEENT: NC/AT    Back: no tenderness    Respiratory: diminished at the base.     Cardiovascular: S1S2 regular, no murmurs    Abdomen: soft, not tender, not distended, positive BS    Genitourinary: voiding            *Acute hypoxic respiratory failure due to covid19    *Multifocal Pneumonia associated with COVID19 infection- POA    - CXRAY results noted    s/p tx with plaquenil and solumedrol and trial of IV lasix     D dimer trended down from 600 to 385    on admission D dimer 2 xULN and age >60 yrs  so meets criteria for xarelto 10 mg daily for 39 days for extended DVt prophylaxis at home    if at rest and ambulation pulse ox >92 on 4 L , then medically stable for discharge to home with outpatient follow up with pulm or PMD in 1 week             discharge time spent 47 mins 73 y/o female presents to the ED c/o worsening SOB, cough, fever, myalgia x2 weeks.  states was tested COVID+ last week at [facility].  states sob has been worsening over time.  +fever, cough.  no other acute complaints at this time.    Patient admitted with acute hypoxic respiratory failure due to [id number] infection.        4/30- titrated down to 6lpm on nasal cannula. stated that she feels better this morning. wants to go home .     5/1- NC down to 4L- doing well with her breathing as per her report- complaining that the lasix makes her urinate too many times- rationale for lasix administration explained to pt     patient.    5/3- pt on 4L NC94-96%, comfortbale while ambulating on 4 L NC  patient reports no SOB , eager to go home and says will be disappointed if cant go today         PE:    Constitutional: NAD, ambulated on 4 L NC pulse ox 90-92% while ambulating     HEENT: NC/AT    Back: no tenderness    Respiratory: diminished at the base.     Cardiovascular: S1S2 regular, no murmurs    Abdomen: soft, not tender, not distended, positive BS    Genitourinary: voiding            *Acute hypoxic respiratory failure due to covid19    *Multifocal Pneumonia associated with COVID19 infection- POA    - CXRAY results noted    s/p tx with plaquenil and solumedrol and trial of IV lasix     D dimer trended down from 600 to 385    on admission D dimer 2 xULN and age >60 yrs  so meets criteria for xarelto 10 mg daily for 39 days for extended DVt prophylaxis at home    if at rest and ambulation pulse ox >92 on 4 L , then medically stable for discharge to home with outpatient follow up with pulm or PMD in 1 week             discharge time spent 47 mins     I discussed with patient and daughter

## 2020-05-02 NOTE — PROGRESS NOTE ADULT - ASSESSMENT
*Acute hypoxic respiratory failure due to covid19  *Multifocal Pneumonia associated with COVID19 infection- POA  - CXRAY results noted  s/p tx with plaquenil and solumedrol and trial of IV lasix   D dimer trended down from 600 to 385  on admission D dimer 2 xULN and age >60 yrs  so meets criteria for xarelto 10 mg daily for 39 days for extended DVt prophylaxis at home  if at rest and ambulation pulse ox >92 on 4 L , then medically stable for discharge to home with outpatient follow up with pulm or PMD in 1 week       discharge time spent 47 mins

## 2020-05-02 NOTE — DISCHARGE NOTE PROVIDER - CARE PROVIDER_API CALL
pmd,   primary doctor  Phone: (   )    -  Fax: (   )    -  Follow Up Time:     Alanna Khan (MD)  Critical Care Medicine; Internal Medicine; Pulmonary Disease; Sleep Medicine  [address] [place] [address]  Phone: [phone]  Fax: [phone]  Follow Up Time: pmd,   primary doctor  Phone: (   )    -  Fax: (   )    -  Follow Up Time:     Cayden Rich)  Critical Care Medicine; Internal Medicine; Pulmonary Disease; Sleep Medicine  [address] [place] [address]  Phone: [phone]  Fax: [phone]  Follow Up Time:

## 2020-05-02 NOTE — DISCHARGE NOTE PROVIDER - NSDCCPCAREPLAN_GEN_ALL_CORE_FT
PRINCIPAL DISCHARGE DIAGNOSIS  Diagnosis: COVID-19 virus detected  Assessment and Plan of Treatment: you have been discharged on home Oxygen.please follow up with your primary doctor in 1 week to see if could be gradually weaned off from home oxygen .  you have been provided with contact for outpatient pulmonology.it is recommended that you see pulmonology in 1 week for further home oxygen management  you have been started on xarelto  for 39 daysfor  clot prevention after covid infection      SECONDARY DISCHARGE DIAGNOSES  Diagnosis: Shortness of breath  Assessment and Plan of Treatment:     Diagnosis: COVID-19 virus detected  Assessment and Plan of Treatment:

## 2020-05-02 NOTE — DISCHARGE NOTE PROVIDER - CARE PROVIDERS DIRECT ADDRESSES
,DirectAddress_Unknown,[email or web address] ,DirectAddress_Unknown,DirectAddress_Unknown

## 2020-05-03 NOTE — PROGRESS NOTE ADULT - REASON FOR ADMISSION
sob/COVID pneumonia
sob
sob/COVID-19 infection
sob/COVID19 infection
sob/COVID19 infection

## 2020-05-03 NOTE — PROGRESS NOTE ADULT - SUBJECTIVE AND OBJECTIVE BOX
75 y/o female presents to the ED c/o worsening SOB, cough, fever, myalgia x2 weeks.  states was tested COVID+ last week at [facility].  states sob has been worsening over time.  +fever, cough.  no other acute complaints at this time.  Patient admitted with acute hypoxic respiratory failure due to [id number] infection.    4/30- titrated down to 6lpm on nasal cannula. stated that she feels better this morning. wants to go home .   5/1- NC down to 4L- doing well with her breathing as per her report- complaining that the lasix makes her urinate too many times- rationale for lasix administration explained to pt   patient.  5/3- pt on 4L NC94-96%, comfortbale while ambulating on 4 L NC  patient reports no SOB , eager to go home and says will be disappointed if cant go today PE:  Constitutional: NAD, ambulated on 4 L NC pulse ox 90-92% while ambulating   HEENT: NC/AT  Back: no tenderness  Respiratory: diminished at the base.   Cardiovascular: S1S2 regular, no murmurs  Abdomen: soft, not tender, not distended, positive BSGenitourinary: voiding

## 2020-05-03 NOTE — CONSULT NOTE ADULT - ASSESSMENT
PROBLEMS:    Acute hypoxic respiratory failure due to covid19  Multifocal Pneumonia associated with COVID19 infection-s/p tx with plaquenil and solumedrol       PLAN:    titrate O2 as per patient tolerance- on nasal cannula-inflammatory markers reviewed trending down- COVID-19 infection has a large spectrum of infection and patient response varies, pat cliniclly improving & down to 4LPM NC will decd to fu as outpat with tele vs office fu  persistant CXR infiltrate repaet cxr as outpat  d/w staff & pat

## 2020-05-03 NOTE — PROGRESS NOTE ADULT - ASSESSMENT
*Acute hypoxic respiratory failure due to covid19  *Multifocal Pneumonia associated with COVID19 infection- POA  - CXRAY results noted  s/p tx with plaquenil and solumedrol and trial of IV lasix   D dimer trended down from 600 to 385  on admission D dimer 2 xULN and age >60 yrs  so meets criteria for xarelto 10 mg daily for 39 days for extended DVt prophylaxis at home  if at rest and ambulation pulse ox >92 on 4 L , then medically stable for discharge to home with outpatient follow up with pulm or PMD in 1 week       discharge time spent 47 mins   i discussed with patient and daughter

## 2020-05-03 NOTE — CONSULT NOTE ADULT - SUBJECTIVE AND OBJECTIVE BOX
HPI:    74 y.o.f admitted c/o worsening SOB, cough, fever, myalgia x2 weeks, COVID+ last week at [facility]. sob has been worsening over time.  +fever, cough.  no other acute complaints at this time.  Patient admitted with acute hypoxic respiratory failure due to [id number] infection, titrated down to 6lpm on nasal cannula, 5/1- NC down to 4L- doing well with her breathing , pat seen for pulmonary before discharge, lying in bed, no distress.    pmh-denies     psh-denies     sochx-nonsmoker, denies alcohol, drug use.  lives at home (25 Apr 2020 16:51)      PAST MEDICAL & SURGICAL HISTORY:  No pertinent past medical history      Home Medications:      MEDICATIONS  (STANDING):  ALBUTerol    90 MICROgram(s) HFA Inhaler 2 Puff(s) Inhalation every 4 hours  dextrose 5%. 1000 milliLiter(s) (50 mL/Hr) IV Continuous <Continuous>  dextrose 50% Injectable 12.5 Gram(s) IV Push once  dextrose 50% Injectable 25 Gram(s) IV Push once  dextrose 50% Injectable 25 Gram(s) IV Push once  enoxaparin Injectable 40 milliGRAM(s) SubCutaneous daily  guaiFENesin  milliGRAM(s) Oral every 12 hours    MEDICATIONS  (PRN):  acetaminophen   Tablet .. 650 milliGRAM(s) Oral every 4 hours PRN Temp greater or equal to 38.5C (101.3F)  benzonatate 100 milliGRAM(s) Oral every 8 hours PRN Cough  dextrose 40% Gel 15 Gram(s) Oral once PRN Blood Glucose LESS THAN 70 milliGRAM(s)/deciliter  glucagon  Injectable 1 milliGRAM(s) IntraMuscular once PRN Glucose LESS THAN 70 milligrams/deciliter      Allergies    No Known Allergies    Intolerances        SOCIAL HISTORY: Denies tobacco, etoh abuse or illicit drug use    FAMILY HISTORY:      Vital Signs Last 24 Hrs  T(C): 37.1 (03 May 2020 10:07), Max: 37.1 (03 May 2020 10:07)  T(F): 98.7 (03 May 2020 10:07), Max: 98.7 (03 May 2020 10:07)  HR: 100 (03 May 2020 10:07) (94 - 101)  BP: 114/60 (03 May 2020 10:07) (113/75 - 119/63)  BP(mean): --  RR: 19 (03 May 2020 10:07) (19 - 19)  SpO2: 90% (03 May 2020 12:03) (84% - 96%)        REVIEW OF SYSTEMS:    CONSTITUTIONAL:  As per HPI.  HEENT:  Eyes:  No diplopia or blurred vision. ENT:  No earache, sore throat or runny nose.  CARDIOVASCULAR:  No pressure, squeezing, tightness, heaviness or aching about the chest, neck, axilla or epigastrium.  RESPIRATORY:  No cough, +shortness of breath, PND or orthopnea.  GASTROINTESTINAL:  No nausea, vomiting or diarrhea.  GENITOURINARY:  No dysuria, frequency or urgency.  MUSCULOSKELETAL:  As per HPI.  SKIN:  No change in skin, hair or nails.  NEUROLOGIC:  No paresthesias, fasciculations, seizures or weakness.  PSYCHIATRIC:  No disorder of thought or mood.  ENDOCRINE:  No heat or cold intolerance, polyuria or polydipsia.  HEMATOLOGICAL:  No easy bruising or bleedings:  .     PHYSICAL EXAMINATION:    GENERAL APPEARANCE:  Pt. is not currently dyspneic, in no distress. Pt. is alert, oriented, and pleasant.  HEENT:  Pupils are normal and react normally. No icterus. Mucous membranes well colored.  NECK:  Supple. No lymphadenopathy. Jugular venous pressure not elevated. Carotids equal.   HEART:   The cardiac impulse has a normal quality. Regular. Normal S1 and S2. There are no murmurs, rubs or gallops noted  CHEST:  Chest crackles to auscultation. Normal respiratory effort.  ABDOMEN:  Soft and nontender.   EXTREMITIES:  There is no cyanosis, clubbing or edema.   SKIN:  No rash or significant lesions are noted.    LABS:                        14.1   10.64 )-----------( 392      ( 02 May 2020 07:01 )             41.8     05-03    135  |  98  |  23  ----------------------------<  111<H>  4.0   |  31  |  0.55    Ca    8.4<L>      03 May 2020 07:04      RADIOLOGY & ADDITIONAL STUDIES:     Chest 1 View-PORTABLE IMMEDIATE (04.25.20 @ 14:33) >  Findings:  Airspace and interstitial infiltrates in the mid and lower lung fields bilaterally greatest in the lateral right mid lung field consistent with multifocal pneumonia. Findings suspicious for viral/atypical pneumonia including Covid 19 infection. No pleural effusion. Heart magnified by projection. Atherosclerotic aorta.    Impression: Multifocal pneumonia as described      DISCRETE X-RAY DATA:  Percent of LEFT lung opacification: %  Percent of RIGHT lung opacification: %  Change in lung opacification from most recent x-ray(<=3 days): No Prior  Change from prior dated 3 or more days (same admission): No Prior

## 2020-05-12 NOTE — PROGRESS NOTE ADULT - SUBJECTIVE AND OBJECTIVE BOX
HPI:      80 year old female with hx. of recent admission for covid, was on home o2. Patient collapsed and had a vt arerest. down time was 20 minutes.      Had stemi, minimally responsive on admission. was turned down for emergent PCI by cardiology      was initilally on levophed now off,  but cxr has severe bilateral infiltrates, on peep 12, 100%., lactate 15    PMH recent admission for covid       MEDICATIONS  (STANDING):  chlorhexidine 0.12% Liquid 15 milliLiter(s) Oral Mucosa every 12 hours  clopidogrel Tablet 300 milliGRAM(s) Oral Once  norepinephrine Infusion 0.05 MICROgram(s)/kG/Min (6.56 mL/Hr) IV Continuous <Continuous>    MEDICATIONS  (PRN):    ICU Vital Signs Last 24 Hrs  T(C): 36.2 (12 May 2020 21:10), Max: 36.2 (12 May 2020 21:10)  T(F): 97.1 (12 May 2020 21:10), Max: 97.1 (12 May 2020 21:10)  HR: 96 (12 May 2020 22:11) (96 - 127)  BP: 140/92 (12 May 2020 22:11) (80/55 - 144/110)  BP(mean): --  ABP: --  ABP(mean): --  RR: 23 (12 May 2020 22:11) (20 - 23)  SpO2: 83% (12 May 2020 22:11) (83% - 100%)      Mode: AC/ CMV (Assist Control/ Continuous Mandatory Ventilation)  RR (machine): 12  TV (machine): 450  FiO2: 100  PEEP: 10  ITime: 1  PIP: 31      I&O's Summary                    10.1   8.83  )-----------( 179      ( 12 May 2020 21:09 )             34.5       05-12    140  |  103  |  13  ----------------------------<  441<H>  4.0   |  15<L>  |  1.08    Ca    8.1<L>      12 May 2020 21:09    TPro  4.9<L>  /  Alb  1.7<L>  /  TBili  0.2  /  DBili  x   /  AST  130<H>  /  ALT  102<H>  /  AlkPhos  104  05-12      CARDIAC MARKERS ( 12 May 2020 21:09 )  0.543 ng/mL / x     / 77 U/L / x     / x          Urinalysis Basic - ( 12 May 2020 21:21 )    Color: Yellow / Appearance: Clear / S.020 / pH: x  Gluc: x / Ketone: Negative  / Bili: Negative / Urobili: Negative mg/dL   Blood: x / Protein: Negative mg/dL / Nitrite: Negative   Leuk Esterase: Trace / RBC: 0-2 /HPF / WBC 3-5   Sq Epi: x / Non Sq Epi: Occasional / Bacteria: Many                                                               Advanced Directives:  DNR    This was a Telehealth visit using A/V capability  Provider was located at [address] [place] [address] HPI:      80 year old female with hx. of recent admission for covid, was on home o2. Patient collapsed and had a vt arerest. down time was 20 minutes.      Had stemi, minimally responsive on admission. was turned down for emergent PCI by cardiology      was initilally on levophed now off,  but cxr has severe bilateral infiltrates, on peep 12, 100%., lactate 15      last cxr during last admission had bilateral infiltrates    PMH recent admission for covid       MEDICATIONS  (STANDING):  chlorhexidine 0.12% Liquid 15 milliLiter(s) Oral Mucosa every 12 hours  clopidogrel Tablet 300 milliGRAM(s) Oral Once  norepinephrine Infusion 0.05 MICROgram(s)/kG/Min (6.56 mL/Hr) IV Continuous <Continuous>    MEDICATIONS  (PRN):    ICU Vital Signs Last 24 Hrs  T(C): 36.2 (12 May 2020 21:10), Max: 36.2 (12 May 2020 21:10)  T(F): 97.1 (12 May 2020 21:10), Max: 97.1 (12 May 2020 21:10)  HR: 96 (12 May 2020 22:11) (96 - 127)  BP: 140/92 (12 May 2020 22:11) (80/55 - 144/110)  BP(mean): --  ABP: --  ABP(mean): --  RR: 23 (12 May 2020 22:11) (20 - 23)  SpO2: 83% (12 May 2020 22:11) (83% - 100%)      Mode: AC/ CMV (Assist Control/ Continuous Mandatory Ventilation)  RR (machine): 12  TV (machine): 450  FiO2: 100  PEEP: 10  ITime: 1  PIP: 31      I&O's Summary                    10.1   8.83  )-----------( 179      ( 12 May 2020 21:09 )             34.5       05-12    140  |  103  |  13  ----------------------------<  441<H>  4.0   |  15<L>  |  1.08    Ca    8.1<L>      12 May 2020 21:09    TPro  4.9<L>  /  Alb  1.7<L>  /  TBili  0.2  /  DBili  x   /  AST  130<H>  /  ALT  102<H>  /  AlkPhos  104  05-12      CARDIAC MARKERS ( 12 May 2020 21:09 )  0.543 ng/mL / x     / 77 U/L / x     / x          Urinalysis Basic - ( 12 May 2020 21:21 )    Color: Yellow / Appearance: Clear / S.020 / pH: x  Gluc: x / Ketone: Negative  / Bili: Negative / Urobili: Negative mg/dL   Blood: x / Protein: Negative mg/dL / Nitrite: Negative   Leuk Esterase: Trace / RBC: 0-2 /HPF / WBC 3-5   Sq Epi: x / Non Sq Epi: Occasional / Bacteria: Many                                                               Advanced Directives:  DNR    This was a Telehealth visit using A/V capability  Provider was located at [address] [place] [address]

## 2020-05-12 NOTE — ED PROVIDER NOTE - CARDIAC, MLM
Normal rate, regular rhythm.  Heart sounds S1, S2.  No murmurs, rubs or gallops. Normal rate, regular rhythm.  Heart sounds S1, S2.  No murmurs, rubs or gallops. Carotid, femoral and DP pulses intact.

## 2020-05-12 NOTE — CONSULT NOTE ADULT - PROBLEM SELECTOR RECOMMENDATION 2
Asa, Plavix, and will start BB if BP improves off pressors to allow it use.  Will Start IV Heparin rosey. given her use of Xarleto.

## 2020-05-12 NOTE — ED ADULT NURSE REASSESSMENT NOTE - NS ED NURSE REASSESS COMMENT FT1
Pt received at 2200. Pt intubated, DNR in placed confirmed with family  by MD Tesfaye and MD Hernandez 7.5 ETT  23at lip. Pt on Vent. Tele ICU consult by MD Tesfaye at 2220. Pt awaiting CT and bed.

## 2020-05-12 NOTE — ED PROVIDER NOTE - CRITICAL CARE PROVIDED
direct patient care (not related to procedure)/documentation/consultation with other physicians consultation with other physicians/documentation/direct patient care (not related to procedure)/consult w/ pt's family directly relating to pts condition/conducted a detailed discussion of DNR status

## 2020-05-12 NOTE — CONSULT NOTE ADULT - SUBJECTIVE AND OBJECTIVE BOX
HPI:  74 year old female No Significant PMHx ( per Chart record and Daughter ), Recently hospitalized for COVID-19 from 2020 to 5/3/2020 with Hypoxia.  Tx with Plaquenil and Steroids and was eventually discharged to Rehab then to home on Supplemental O2 via NC and Xarelto for DVT PPx.  As per Chart, Daughter describes her as having resumed mostly normal activities and was requiring only 2L NC during the day and 4l NC while asleep.  Daughter states patient participated in Rehab today and did well.  Tonight became unresponsive, had witnessed Cardaic Arrest by daughter ( who is an RN ) and preformed bystander CPR until EMS arrived.  On [place] [facility] found patient to be in VT and was Defib X 1 with multiple Epinephrine and ROSC achieved.  On [place] in ER, ECG showed Acute Anterolateral STEMI.  She was also in shock with need for pressors to control BP.  No discernable neurological reflexes and received no sedating medication.  Is unresponsive to painful stimuli as well.      PAST MEDICAL & SURGICAL HISTORY:  COVID-19 PNA    SOCIAL HISTORY: Non-Smoker/No ETOH/ No Ilicit Drug use.    FAMILY HISTORY:  Unable to obtain from patient.  Family states no heart disease in her family.    Allergies  No Known Allergies    Home Medications:  XArelto  Oxygen    HOSPITAL MEDICATIONS:   MEDICATIONS  (STANDING):  chlorhexidine 0.12% Liquid 15 milliLiter(s) Oral Mucosa every 12 hours  clopidogrel Tablet 300 milliGRAM(s) Oral Once  norepinephrine Infusion 0.05 MICROgram(s)/kG/Min (6.56 mL/Hr) IV Continuous <Continuous>  pantoprazole  Injectable 40 milliGRAM(s) IV Push daily  propofol Infusion 20 MICROgram(s)/kG/Min (8.4 mL/Hr) IV Continuous <Continuous>    MEDICATIONS  (PRN):    REVIEW OF SYSTEMS: Unable to obtain    Vital Signs Last 24 Hrs  T(C): 36.1 (13 May 2020 05:43), Max: 36.2 (12 May 2020 21:10)  T(F): 96.9 (13 May 2020 05:43), Max: 97.1 (12 May 2020 21:10)  HR: 139 (13 May 2020 06:30) (80 - 162)  BP: 115/99 (13 May 2020 06:30) (80/55 - 148/93)  BP(mean): 102 (13 May 2020 06:30) (34 - 106)  RR: 37 (13 May 2020 06:30) (20 - 64)  SpO2: 100% (13 May 2020 06:30) (83% - 100%)Daily     Daily Weight in k (13 May 2020 00:30)I&O's Summary    12 May 2020 07:01  -  13 May 2020 07:00  --------------------------------------------------------  IN: 0 mL / OUT: 775 mL / NET: -775 mL        PHYSICAL EXAM:  Constitutional: Unresponisve and intubated,  well-developed  HEENT: P non-reactivce, mildly dilated, no dolls eyes.  No oral cyanosis. Oropharynx ETT tube in place.  Neck:  supple, No Carotid Bruits bilaterally.  Respiratory: Breath sounds are bilateral  rhonchi  Cardiovascular: NL S1 and S2, RRR, 1/6 CHANTAL, No s3  Gastrointestinal: Bowel Sounds present   Extremities: Trace peripheral edema. No clubbing or cyanosis.   Vascular: 1+ peripheral pulses in LE   Neurological: No response to pain, no muscle tone, no babinski, no corneal reflex, no dolls eyes  Musculoskeletal: no joint swelling.  Skin: No rashes.      LABS: All Labs Reviewed:                        10.1   8.83  )-----------( 179      ( 12 May 2020 21:09 )             34.5     12 May 2020 21:09    140    |  103    |  13     ----------------------------<  441    4.0     |  15     |  1.08     Ca    8.1        12 May 2020 21:09    TPro  4.9    /  Alb  1.7    /  TBili  0.2    /  DBili  x      /  AST  130    /  ALT  102    /  AlkPhos  104    12 May 2020 21:09    PT/INR - ( 12 May 2020 21:09 )   PT: 12.4 sec;   INR: 1.11 ratio         PTT - ( 12 May 2020 21:09 )  PTT:53.1 sec  CARDIAC MARKERS ( 12 May 2020 21:09 )  0.543 ng/mL / x     / 77 U/L / x     / x        05-12 @ 21:09  Pro Bnp 144      RADIOLOGY:  < from: Xray Chest 1 View-PORTABLE IMMEDIATE (20 @ 21:29) >  IMPRESSION:    Endotracheal tube with the tip projecting just above the ne. Recommend retraction. Feeding tube as described.    Extensive bilateral pulmonary opacities, in keeping with COVID-19 pneumonia.    < end of copied text >    EKG:  NSR, Anterolateral STEMI

## 2020-05-12 NOTE — ED ADULT NURSE NOTE - OBJECTIVE STATEMENT
pt biba s/p witnessed cardiac arrest. as per EMS, pt received approximately 15mins of compressions. pt was in VFIB, 1 shock, 4 EPI, 1 bicarb. Blood sugar of 141. achieved ROSC, pt intubated in field.  EKG shows ACUTE MI, CODE STEMI CALLED OVERHEAD. pt biba s/p witnessed cardiac arrest. as per EMS, pt received approximately 15mins of compressions. pt was in VFIB, 1 shock, 4 EPI, 1 bicarb. Blood sugar of 141. achieved ROSC, pt intubated in field size 7.5 ETT 23 @ lip.   EKG shows ACUTE MI, CODE STEMI CALLED OVERHEAD.

## 2020-05-12 NOTE — ED ADULT TRIAGE NOTE - CHIEF COMPLAINT QUOTE
pt biba s/p witnessed cardiac arrest. as per EMS, pt received approximately 15mins of compressions. pt was in VFIB, 1 shock, 4 EPI, 1 bicarb. Blood sugar of 141. achieved ROSC, pt intubated in field.

## 2020-05-12 NOTE — ED PROVIDER NOTE - OBJECTIVE STATEMENT
74 y/o female with no relevant PMHx presents to the ED s/p witnessed cardiac arrest at home. Daughter started CPR right away and called EMS. Pt was in V-fib arrest, got 1 shock, 4 epi and 1 bicarb with ROSC. Total down time approximately 20 minutes. Pt intubated by EMS, no paralytics or sedation given PTA. Pt was recently admitted from 4/25/2020-5/3/2020 for acute hypoxic respiratory failure in setting of COVID-19 infection. History obtained by EMS. History limited due to pt unresponsive.

## 2020-05-12 NOTE — CONSULT NOTE ADULT - PROBLEM SELECTOR RECOMMENDATION 9
Extensive discussion with family regarding clinical situation and neurological status and they expressed that patients wishes would not be to have heroic measures in this situation given her clinical situation and therefore decision was mooney to make her DNR and not have invasive cardiac procedures.  Will proceed with medical management and monitor her response. Cooling protocol and Covid treatment as per critical care.

## 2020-05-12 NOTE — ED PROVIDER NOTE - PROGRESS NOTE DETAILS
Scribe CD for ED attending, Doctor Tesfaye. Spoke with Dr. Hernandez. States to call the PCI team and he will be here in 20 minutes. Scribe CD for ED attending, Doctor Tesfaye. Pt's daughter in waiting room provides additional history. States pt has been on 2L O2 since discharge from hospital. Daughter states that pt "felt like she was having a panic attack" just prior to arrest. Daughter states she turned around to close the shades and when she turned back around pt was unconscious. Pt has no h/o cardiac disease. Scribe CD for ED attending, Doctor Brien. Dr. Hernandez and I spoke with family. Will not take pt to cath lab at this time. Pt has no neurological function at the moment. Will cool, give ASA and Plavix. Will call ICU for consult and admission. Family to make pt DNR. Scribe CD for ED attending, Doctor Brien. Spoke with Dr. Melgar. States that pt is too unstable to cool given satting at 85% on FiO2 100% and PEEP 12. Asked to call ICU PA for admission. Scribe CD for ED attending, Doctor Brien. Spoke with ICU PA. Will be down to see pt. Scribe CD for ED attending, Doctor Tesfaye. Pt currently satting at 78%, appears dyssynchronous with vent. Will sedate and see if this improves oxygenation.

## 2020-05-12 NOTE — PROGRESS NOTE ADULT - ASSESSMENT
Patient with hx. of covid 19 , s/p cardiac arrest at home. vtach,  20 minutes down time, with anoxic encephalopathy likely   was turned down for pci can treat with asa, plavix  also likely with hypoxia from covid given cxr, given high peep, o2 requirement, large volume needed for hypothermia protocol would not be tolerated.  patient intubated,  Patient being admitted to unit, requiring high o2 peep  not requiring pressors at this time  supportive care  prognosis poor. Patient with hx. of covid 19 , s/p cardiac arrest at home. vtach,  20 minutes down time, with anoxic encephalopathy likely   was turned down for pci can treat with asa, plavix  also likely with hypoxia from covid, vs. aspiration given cxr, given high peep, o2 requirement, large volume needed for hypothermia protocol would not be tolerated.  patient intubated,  Patient being admitted to unit, requiring high o2 peep  not requiring pressors at this time  supportive care  prognosis poor.

## 2020-05-12 NOTE — H&P ADULT - ATTENDING COMMENTS
case d/w KRISTEN Dougherty at 7:00 am sign out   agree with admission to hospital as well as admission to critical care given hemodynamic instability  pt at risk for deterioration, morbidity and mortality given current situation requiring intensive monitoring and acute intervention as noted as well as artificial support of respiratory and cardiovascular systems.

## 2020-05-12 NOTE — CONSULT NOTE ADULT - SUBJECTIVE AND OBJECTIVE BOX
----------------------------------------------------------------------------------------------------------------------------------------------------------------------------------------------------------------------------------------------------------------------------------------------------------------------------------------------------------------------------------------------------------------------------------------------------------------------------------------------------------------------------------------------------------------------------------------------------------------------------------------------------------------------------------------------------------- No Significant PMHx ( per Chart record and Daughter ), Recently hospitalized for COVID-19 from 4/25/2020 to 5/3/2020 with Hypoxia.  Tx with Plaquenil and Steroids and was eventually discharged to Rehab then to home on Supplemental O2 via NC and Xarelto for DVT PPx.  Daughter describes her as having resumed mostly normal activities and was requiring only 2L NC during the day and 4l NC while asleep.  Daughter states patient participated in Rehab today and did well.  Tonight became unresponsive, had witnessed Cardaic Arrest by daughter ( who is an RN ) and preformed bystander CPR        PAST MEDICAL & SURGICAL HISTORY:  No pertinent past medical history: COVID-19        SOCIAL HISTORY: Non-Smoker/Social ETOH/ No Ilicit Drug use.    FAMILY HISTORY:      Allergies    No Known Allergies    Intolerances        Home Medications:      HOSPITAL MEDICATIONS:   MEDICATIONS  (STANDING):  chlorhexidine 0.12% Liquid 15 milliLiter(s) Oral Mucosa every 12 hours  clopidogrel Tablet 300 milliGRAM(s) Oral Once  norepinephrine Infusion 0.05 MICROgram(s)/kG/Min (6.56 mL/Hr) IV Continuous <Continuous>  pantoprazole  Injectable 40 milliGRAM(s) IV Push daily  propofol Infusion 20 MICROgram(s)/kG/Min (8.4 mL/Hr) IV Continuous <Continuous>    MEDICATIONS  (PRN):      REVIEW OF SYSTEMS: 13 systems were reviewed and all negative except for comments above.    Vital Signs Last 24 Hrs  T(C): 36.2 (12 May 2020 21:10), Max: 36.2 (12 May 2020 21:10)  T(F): 97.1 (12 May 2020 21:10), Max: 97.1 (12 May 2020 21:10)  HR: 96 (12 May 2020 22:11) (96 - 127)  BP: 140/92 (12 May 2020 22:11) (80/55 - 144/110)  BP(mean): --  RR: 23 (12 May 2020 22:11) (20 - 23)  SpO2: 83% (12 May 2020 22:11) (83% - 100%)Daily     Daily I&O's Summary      PHYSICAL EXAM:    Constitutional: NAD, awake and alert, well-developed  HEENT: PERRLA, EOMI,  No oral cyanosis. Oropharynx Clean and Dry.  Neck:  supple,  No JVD, No Thyroid enlargement. No Carotid Bruits bilaterally.  Respiratory: Breath sounds are clear bilaterally, No wheezing, rales or rhonchi  Cardiovascular: NL S1 and S2, RRR, no Murmurs, gallops or rubs  Gastrointestinal: Bowel Sounds present, soft, NT, ND, No HSM.   Extremities: No peripheral edema. No clubbing or cyanosis.  Barbeau Test performed in R+L UE and Negative.  Vascular: 2+ peripheral pulses in LE   Neurological: A/O x 3, no focal motor or sensory deficits  Musculoskeletal: no calf tenderness or joint swelling.  Skin: No rashes or lessions.      LABS: All Labs Reviewed:                        10.1   8.83  )-----------( 179      ( 12 May 2020 21:09 )             34.5     12 May 2020 21:09    140    |  103    |  13     ----------------------------<  441    4.0     |  15     |  1.08     Ca    8.1        12 May 2020 21:09    TPro  4.9    /  Alb  1.7    /  TBili  0.2    /  DBili  x      /  AST  130    /  ALT  102    /  AlkPhos  104    12 May 2020 21:09    PT/INR - ( 12 May 2020 21:09 )   PT: 12.4 sec;   INR: 1.11 ratio         PTT - ( 12 May 2020 21:09 )  PTT:53.1 sec  CARDIAC MARKERS ( 12 May 2020 21:09 )  0.543 ng/mL / x     / 77 U/L / x     / x          05-12 @ 21:09  Pro Bnp 144        RADIOLOGY:    EKG:    ECHO:    CARDIAC CATHTERIZATION/STRESS TEST:

## 2020-05-12 NOTE — H&P ADULT - HISTORY OF PRESENT ILLNESS
74 year old female No Significant PMHx ( per Chart record and Daughter ), Recently hospitalized for COVID-19 from 4/25/2020 to 5/3/2020 with Hypoxia.  Tx with Plaquenil and Steoids and was eventually discharged to Rehab then home on Supplemental O2 via NC and Xarelto for DVT PPx. 74 year old female No Significant PMHx ( per Chart record and Daughter ), Recently hospitalized for COVID-19 from 4/25/2020 to 5/3/2020 with Hypoxia.  Tx with Plaquenil and Steroids and was eventually discharged to Rehab then to home on Supplemental O2 via NC and Xarelto for DVT PPx.  Daughter describes her as having resumed mostly normal activities and was requiring only 2L NC during the day and 4l NC while asleep.  Daughter states patient participated in Rehab today and did well.  Tonight became unresponsive, had witnessed Cardaic Arrest by daughter ( who is an RN ) and preformed bystander CPR until EMS arrived.  On [facility] found patient to be in VT and was Defib X 1 with multiple Epinephrine and ROSC achieved.  In ED unresponsive with shock state and High Vent requirements Fio2 100% +15cm peep and remains hypoxic with O2 sats in 80's.

## 2020-05-12 NOTE — ED PROVIDER NOTE - ENMT, MLM
+ET tube in place. Nasal mucosa clear. Mouth with normal mucosa. Throat has no vesicles, no oropharyngeal exudates and uvula is midline.

## 2020-05-12 NOTE — ED PROVIDER NOTE - CLINICAL SUMMARY MEDICAL DECISION MAKING FREE TEXT BOX
S/p cardiac arrest, STEMI patient.  Dr. Meade recommends against emergent cath.  Plan for ASA, plavix.  Recommendation for cooling, however too unstable to cool at this time.  CTA and CT pending.  ICU admission.  Pt made DNR with trial period of intubation.  MOLST form in chart.

## 2020-05-12 NOTE — H&P ADULT - ASSESSMENT
74 year old female No Significant PMHx ( per Chart record and Daughter ), Recently hospitalized for COVID-19 from 4/25/2020 to 5/3/2020 with Hypoxia.  Tx with Plaquenil and Steroids and was eventually discharged to Rehab then to home on Supplemental O2 via NC and Xarelto for DVT PPx.  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  Likely Severe Anoxic brain Injury    Shock - Cardiogenic       NEURO - Unresponsive - Light sedation for vent compliance     PULM - CMV ventilation with lung protective settings               High Fio2 and High Peep will actively titrate per ARDSNET protocol              Vent Bundle ordered    CV - Was able to titrate off Pressor         Avoid Fluid Challenges - Pressors as needed for shock          ASA and Plavix per cardio after clear head CT          Will Resume Xarelto as well if CT brain is Neg          No Role for Temp Targeted management given COVID-19 and High Vent requirements and Shock     GI - NPO / OGT / PPI     RENAL - Watch FB - Try to keep Neg to Even     ID - Watch off ABX     DVT - AS above     Goals Of Care -  Discussed with Daughter severity of patients condition and likelihood that she may die tonight.  Daughter understands, and has made patient a DNR.  Is considering WOC if patient continues to do poorly over the next day.      CRITICAL CARE TIME SPENT: 50 minutes   (Assessing presenting problems of acute illness, which pose high probability of life threatening deterioration or end organ damage/dysfunction, as well as medical decision making including initiating plan of care, reviewing data, reviewing radiologic exams, discussing with multidisciplinary team,  discussing goals of care with patient/family, and writing this note.  Non-inclusive of procedures performed) 74 year old female No Significant PMHx ( per Chart record and Daughter ), Recently hospitalized for COVID-19 from 4/25/2020 to 5/3/2020 with Hypoxia.  Tx with Plaquenil and Steroids and was eventually discharged to Rehab then to home on Supplemental O2 via NC and Xarelto for DVT PPx.     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  Likely Severe Anoxic brain Injury    Shock - Cardiogenic       NEURO - Unresponsive - Light sedation for vent compliance     PULM - CMV ventilation with lung protective settings               High Fio2 and High Peep will actively titrate per ARDSNET protocol              Vent Bundle ordered    CV - Was able to titrate off Pressor         Avoid Fluid Challenges - Pressors as needed for shock          ASA and Plavix per cardio after clear head CT          Will Resume Xarelto as well if CT brain is Neg          No Role for Temp Targeted management given COVID-19 and High Vent requirements and Shock     GI - NPO / OGT / PPI     RENAL - Watch FB - Try to keep Neg to Even     ID - Watch off ABX     DVT - AS above     Case D/W eICU Dr Melgar    Goals Of Care -  Discussed with Daughter severity of patients condition and likelihood that she may die tonight.  Daughter understands, and has made patient a DNR.  Is considering WOC if patient continues to do poorly over the next day.      CRITICAL CARE TIME SPENT: 50 minutes   (Assessing presenting problems of acute illness, which pose high probability of life threatening deterioration or end organ damage/dysfunction, as well as medical decision making including initiating plan of care, reviewing data, reviewing radiologic exams, discussing with multidisciplinary team,  discussing goals of care with patient/family, and writing this note.  Non-inclusive of procedures performed)

## 2020-05-13 NOTE — DISCHARGE NOTE FOR THE EXPIRED PATIENT - HOSPITAL COURSE
74 year old female No Significant PMHx (per Chart record and Daughter), Recently hospitalized for COVID-19 from 2020 to 5/3/2020 with Hypoxia.  Tx with Plaquenil and Steroids and was eventually discharged to Rehab then to home on Supplemental O2 via NC, as well as Xarelto for DVT PPx.  As per Chart, Daughter describes her as having resumed mostly normal activities and was requiring only 2L NC during the day and 4l NC while asleep.  Daughter states patient was in her usual state of health, later that night suddenly became unresponsive, had witnessed Cardiac Arrest by daughter, who is an RN, and initiated bystander CPR until EMS arrived. On [facility] found patient to be in VT and was Defib X 1 with multiple Epinephrine and ROSC achieved. Total downtime estimated at ~20 minutes. On [facility] in ER, ECG showed Acute Anterolateral STEMI. She was severely hypoxic with high vent requirements, and in shock with need for vasopressors. Upon arrival, patient was unresponsive to painful stimuli with no discernable neurological reflexes and received no sedating medication concerning for anoxic encephalopathy. Labs revealed ATN, transaminitis, metabolic lactic acidosis. Family was contacted, and they elected to make patient DNR. Patient developed VF this morning and  at 08:44 am. Her son Ross was contacted and notified of her passing. He opted not to visit, and will call his sisters to notify them of their mother's passing.

## 2020-05-13 NOTE — DISCHARGE NOTE FOR THE EXPIRED PATIENT - SECONDARY DIAGNOSIS.
Cardiac arrest COVID-19 ST elevation myocardial infarction (STEMI), unspecified artery Metabolic acidosis Anoxic encephalopathy

## 2020-05-18 LAB
CULTURE RESULTS: SIGNIFICANT CHANGE UP
SPECIMEN SOURCE: SIGNIFICANT CHANGE UP

## 2020-12-12 NOTE — ED PROVIDER NOTE - ENMT, MLM
Airway patent, Nasal mucosa clear. Throat has no vesicles, no oropharyngeal exudates and uvula is midline. +Dry mucous membranes None
